# Patient Record
Sex: FEMALE | Race: WHITE | NOT HISPANIC OR LATINO | Employment: FULL TIME | URBAN - NONMETROPOLITAN AREA
[De-identification: names, ages, dates, MRNs, and addresses within clinical notes are randomized per-mention and may not be internally consistent; named-entity substitution may affect disease eponyms.]

---

## 2018-06-21 ENCOUNTER — OFFICE VISIT (OUTPATIENT)
Dept: URGENT CARE | Facility: PHYSICIAN GROUP | Age: 38
End: 2018-06-21

## 2018-06-21 VITALS
TEMPERATURE: 98.9 F | HEART RATE: 80 BPM | BODY MASS INDEX: 22.98 KG/M2 | WEIGHT: 143 LBS | OXYGEN SATURATION: 98 % | HEIGHT: 66 IN | DIASTOLIC BLOOD PRESSURE: 70 MMHG | SYSTOLIC BLOOD PRESSURE: 116 MMHG | RESPIRATION RATE: 14 BRPM

## 2018-06-21 DIAGNOSIS — Z02.1 PHYSICAL EXAM, PRE-EMPLOYMENT: Primary | ICD-10-CM

## 2018-06-21 PROCEDURE — 8915 PR COMPREHENSIVE PHYSICAL: Performed by: PHYSICIAN ASSISTANT

## 2018-06-21 NOTE — PROGRESS NOTES
Subjective:      Pt is a 38 y.o. female who presents with Employment Physical            HPI  Pt is here today for a work physical. Pt denies taking any medication. Pt states they have been in good health with no infections or illnesses lately. PT denies significant PMH and PSH. Pt denies CP, SOB, NVD, paresthesias, headaches, dizziness, change in vision, hives, or joint pain. Pt states current pain is 0/10. The pt's medication list, problem list, and allergies have been evaluated and reviewed during today's visit.    PMH:  Negative per pt.      PSH:  Negative per pt.      Fam Hx:  the patient's family history is not pertinent to their current complaint      Soc HX:  Social History     Social History   • Marital status: Single     Spouse name: N/A   • Number of children: N/A   • Years of education: N/A     Occupational History   • Not on file.     Social History Main Topics   • Smoking status: Former Smoker     Packs/day: 0.50     Quit date: 7/4/2013   • Smokeless tobacco: Never Used   • Alcohol use Yes      Comment: weekends   • Drug use: No   • Sexual activity: Not on file     Other Topics Concern   • Not on file     Social History Narrative   • No narrative on file         Medications:  No current outpatient prescriptions on file.      Allergies:  Demerol and Lexapro    ROS  Constitutional: Negative for fever, chills and malaise/fatigue.   HENT: Negative for congestion and sore throat.    Eyes: Negative for blurred vision, double vision and photophobia.   Respiratory: Negative for cough and shortness of breath.    Cardiovascular: Negative for chest pain and palpitations.   Gastrointestinal: Negative for heartburn, nausea, vomiting, abdominal pain, diarrhea and constipation.   Genitourinary: Negative for dysuria and flank pain.   Musculoskeletal: Negative for joint pain and myalgias.   Skin: Negative for itching and rash.   Neurological: Negative for dizziness, tingling and headaches.   Endo/Heme/Allergies: Does  "not bruise/bleed easily.   Psychiatric/Behavioral: Negative for depression. The patient is not nervous/anxious.           Objective:     /70   Pulse 80   Temp 37.2 °C (98.9 °F)   Resp 14   Ht 1.676 m (5' 6\")   Wt 64.9 kg (143 lb)   SpO2 98%   BMI 23.08 kg/m²      Physical Exam       Constitutional: PT is oriented to person, place, and time. PT appears well-developed and well-nourished. No distress.   HENT:   Head: Normocephalic and atraumatic.   Mouth/Throat: Oropharynx is clear and moist. No oropharyngeal exudate.   Eyes: Conjunctivae normal and EOM are normal. Pupils are equal, round, and reactive to light.   Neck: Normal range of motion. Neck supple. No thyromegaly present.   Cardiovascular: Normal rate, regular rhythm, normal heart sounds and intact distal pulses.  Exam reveals no gallop and no friction rub.    No murmur heard.  Pulmonary/Chest: Effort normal and breath sounds normal. No respiratory distress. PT has no wheezes. PT has no rales. Pt exhibits no tenderness.   Abdominal: Soft. Bowel sounds are normal. PT exhibits no distension and no mass. There is no tenderness. There is no rebound and no guarding.   Musculoskeletal: Normal range of motion. PT exhibits no edema and no tenderness.   Neurological: PT is alert and oriented to person, place, and time. PT has normal reflexes. No cranial nerve deficit.   Skin: Skin is warm and dry. No rash noted. PT is not diaphoretic. No erythema.       Psychiatric: PT has a normal mood and affect. PT behavior is normal. Judgment and thought content normal.        Assessment/Plan:     1. Physical exam, pre-employment    1. Sports physical    Please see scanned work physical form.  Pt is cleared for work at this time.  AVS with medical info given.  Pt was in full understanding and agreement with the plan.  Follow-up as needed if symptoms worsen or fail to improve.        "

## 2018-06-21 NOTE — PATIENT INSTRUCTIONS

## 2019-07-09 ENCOUNTER — OFFICE VISIT (OUTPATIENT)
Dept: URGENT CARE | Facility: PHYSICIAN GROUP | Age: 39
End: 2019-07-09
Payer: COMMERCIAL

## 2019-07-09 VITALS
OXYGEN SATURATION: 99 % | RESPIRATION RATE: 18 BRPM | DIASTOLIC BLOOD PRESSURE: 62 MMHG | WEIGHT: 130 LBS | TEMPERATURE: 96.9 F | HEART RATE: 78 BPM | HEIGHT: 66 IN | BODY MASS INDEX: 20.89 KG/M2 | SYSTOLIC BLOOD PRESSURE: 106 MMHG

## 2019-07-09 DIAGNOSIS — K04.7 TOOTH INFECTION: ICD-10-CM

## 2019-07-09 PROCEDURE — 99214 OFFICE O/P EST MOD 30 MIN: CPT | Performed by: PHYSICIAN ASSISTANT

## 2019-07-09 RX ORDER — IBUPROFEN 200 MG
200 TABLET ORAL EVERY 6 HOURS PRN
Status: ON HOLD | COMMUNITY
End: 2021-07-16

## 2019-07-09 RX ORDER — AMOXICILLIN AND CLAVULANATE POTASSIUM 875; 125 MG/1; MG/1
1 TABLET, FILM COATED ORAL 2 TIMES DAILY
Qty: 20 TAB | Refills: 0 | Status: SHIPPED | OUTPATIENT
Start: 2019-07-09 | End: 2019-07-19

## 2019-07-09 ASSESSMENT — ENCOUNTER SYMPTOMS
COUGH: 0
NAUSEA: 0
FEVER: 0
SORE THROAT: 0
VOMITING: 0
SINUS PAIN: 1
CHILLS: 0

## 2019-07-09 NOTE — PROGRESS NOTES
"Subjective:   Lorna Steele is a 39 y.o. female who presents for Oral Swelling (left side tooth [id number] )        This is a new problem. Pt complains of swelling and tooth pain x 2 days. Report missing filling from affected tooth (right upper tooth)  Pain is worse with eating, drinking and cold liquids. No other aggravating or alleviating factors. Took 400mg ibuprofen this morning for pain.  Denies nausea, vomiting, fevers, chills. Endorses mild facial swelling and redness.      Review of Systems   Constitutional: Negative for chills and fever.   HENT: Positive for sinus pain. Negative for sore throat.    Respiratory: Negative for cough.    Gastrointestinal: Negative for nausea and vomiting.       PMH: dental caries  MEDS:   Current Outpatient Prescriptions:   •  ibuprofen (MOTRIN) 200 MG Tab, Take 200 mg by mouth every 6 hours as needed., Disp: , Rfl:   •  amoxicillin-clavulanate (AUGMENTIN) 875-125 MG Tab, Take 1 Tab by mouth 2 times a day for 10 days., Disp: 20 Tab, Rfl: 0  ALLERGIES:   Allergies   Allergen Reactions   • Demerol      hives   • Lexapro      SURGHX: no relevant surgical hx  SOCHX:  reports that she quit smoking about 6 years ago. She smoked 0.50 packs per day. She has never used smokeless tobacco. She reports that she drinks alcohol. She reports that she does not use drugs.  FH: Family history was reviewed, no pertinent findings to report   Objective:   /62   Pulse 78   Temp 36.1 °C (96.9 °F) (Temporal)   Resp 18   Ht 1.676 m (5' 6\")   Wt 59 kg (130 lb)   SpO2 99%   BMI 20.98 kg/m²   Physical Exam   Constitutional: She is oriented to person, place, and time. She appears well-developed and well-nourished.  Non-toxic appearance. No distress.   HENT:   Head: Normocephalic and atraumatic.   Right Ear: External ear normal.   Left Ear: External ear normal.   Nose: Nose normal. No mucosal edema or rhinorrhea.   Mouth/Throat: Uvula is midline, oropharynx is clear and moist and mucous " membranes are normal.       Mild edema and erythema of left cheek.   Eyes: Conjunctivae and lids are normal.   Neck: Neck supple.   Cardiovascular: Normal rate and regular rhythm.    Pulmonary/Chest: Effort normal and breath sounds normal. No respiratory distress.   Abdominal: Normal appearance.   Musculoskeletal:   Normal range of motion. Exhibits no edema and no tenderness.    Neurological: She is alert and oriented to person, place, and time. No cranial nerve deficit or sensory deficit.   Skin: Skin is warm, dry and intact. Capillary refill takes less than 2 seconds.   Psychiatric: She has a normal mood and affect. Her speech is normal and behavior is normal. Judgment and thought content normal. Cognition and memory are normal.   Vitals reviewed.        Assessment/Plan:   1. Tooth infection  - amoxicillin-clavulanate (AUGMENTIN) 875-125 MG Tab; Take 1 Tab by mouth 2 times a day for 10 days.  Dispense: 20 Tab; Refill: 0    Other orders  - ibuprofen (MOTRIN) 200 MG Tab; Take 200 mg by mouth every 6 hours as needed.    Pt instructed to complete full course of medication despite symptomatic improvement. Pt to take med with meals to alleviate GI upset. Pt to take a probiotic or eat Rebecca’s yogurt/ kefir while taking the medication.    Rinse with hydrogen peroxide daily. Ibuprofen prn pain.  F/U with dental within the next 5-10 days.    Differential diagnosis, natural history, supportive care, and indications for immediate follow-up discussed.

## 2019-12-02 ENCOUNTER — TELEPHONE (OUTPATIENT)
Dept: SCHEDULING | Facility: IMAGING CENTER | Age: 39
End: 2019-12-02

## 2019-12-17 ENCOUNTER — TELEPHONE (OUTPATIENT)
Dept: SCHEDULING | Facility: IMAGING CENTER | Age: 39
End: 2019-12-17

## 2020-02-25 ENCOUNTER — OFFICE VISIT (OUTPATIENT)
Dept: MEDICAL GROUP | Facility: PHYSICIAN GROUP | Age: 40
End: 2020-02-25
Payer: COMMERCIAL

## 2020-02-25 VITALS
BODY MASS INDEX: 22.5 KG/M2 | HEIGHT: 66 IN | RESPIRATION RATE: 16 BRPM | OXYGEN SATURATION: 99 % | HEART RATE: 62 BPM | WEIGHT: 140 LBS | SYSTOLIC BLOOD PRESSURE: 108 MMHG | TEMPERATURE: 97.4 F | DIASTOLIC BLOOD PRESSURE: 70 MMHG

## 2020-02-25 DIAGNOSIS — N92.6 IRREGULAR PERIODS: ICD-10-CM

## 2020-02-25 PROCEDURE — 99204 OFFICE O/P NEW MOD 45 MIN: CPT | Performed by: FAMILY MEDICINE

## 2020-02-25 NOTE — PROGRESS NOTES
Subjective:     CC:  The encounter diagnosis was Irregular periods.    HISTORY OF THE PRESENT ILLNESS: Patient is a 39 y.o. female. This pleasant patient is here today to establish care and discuss the following problems.   Prior PCP: None.    Irregular periods  This is a new/chronic condition.  Symptom onset: She has had irregular periods since age 30.   Current symptoms: She is having spotting all month long. No menstrual cramps.   Since onset symptoms are: Unchanged  Modifying factors: Had pelvic US 5 years ago.   Treatments tried: Has never had an IUD. Has tried OCPs but had a lot of mood swings. Has also had bio-identical cream without help.   Associated symptoms: Mild dyspareunia     OB hx:   No STD hx. However, she did have PID and treated properly approx age 17.   Menarche at 12/regular/7 days        Allergies: Lexapro and Demerol    No current Epic-ordered outpatient medications on file.     No current [facility]-ordered facility-administered medications on file.        Past Medical History:   Diagnosis Date   • Irregular periods        Past Surgical History:   Procedure Laterality Date   • PRIMARY C SECTION         Social History     Tobacco Use   • Smoking status: Former Smoker     Last attempt to quit: 2018     Years since quittin.1   • Smokeless tobacco: Never Used   Substance Use Topics   • Alcohol use: Yes     Alcohol/week: 1.2 oz     Types: 2 Glasses of wine per week   • Drug use: Never       Social History     Social History Narrative   • Not on file       Family History   Problem Relation Age of Onset   • Cancer Mother         ovarian   • Cancer Maternal Grandmother         ovarian       Health Maintenance: Completed    ROS:   Gen: no fevers/chills, no changes in weight  Eyes: no changes in vision  ENT: no sore throat, no hearing loss, no bloody nose  Pulm: no sob, no cough  CV: no chest pain, no palpitations  GI: no nausea/vomiting, no diarrhea  : no dysuria  MSk: no myalgias  Skin: no  "rash  Neuro: no headaches, no numbness/tingling  Heme/Lymph: + easy bruising      Objective:     Exam: /70 (BP Location: Left arm, Patient Position: Sitting, BP Cuff Size: Adult)   Pulse 62   Temp 36.3 °C (97.4 °F) (Temporal)   Resp 16   Ht 1.676 m (5' 6\")   Wt 63.5 kg (140 lb)   SpO2 99%  Body mass index is 22.6 kg/m².    General: Normal appearing. No distress.  HENT: Normocephalic. Ears normal shape and contour, canals are clear bilaterally, tympanic membranes are benign, nasal mucosa benign, oropharynx is without erythema, edema or exudates.   Eyes: Conjunctiva clear lids without ptosis, pupils equal and reactive to light accommodation.  Neck: Supple without JVD. Thyroid is not enlarged.  Pulmonary: Clear to ausculation.  Normal effort. No rales, ronchi, or wheezing.  Cardiovascular: Regular rate and rhythm without murmur. Radial pulses are intact and equal bilaterally.  Abdomen: Soft, slight tender to the right lower pelvic region, nondistended. Normal bowel sounds. Liver and spleen are not palpable  Neurologic: Moves all extremities  Lymph: No cervical or supraclavicular lymph nodes are palpable  Skin: Warm and dry.  No obvious lesions.  Musculoskeletal: Normal gait. No extremity cyanosis, clubbing, or edema.  Psych: Normal mood and affect. Alert and oriented x3. Judgment and insight is normal.    Assessment & Plan:   39 y.o. female with the following -    1. Irregular periods  This is a chronic, unchanged condition.  The patient has a longstanding history of irregular periods often lasting 7 days with intermittent spotting in between periods of unknown etiology.  She does endorse a history of \"cysts \"and there was a conversation of a possible hysterectomy but she decided to hold off on this approximately 5 years ago.  She continues to experience the same problems.  She has tried oral contraceptives but is not sure which one and is not interested in them at this point.  Has never tried IUD either.  " At this time I would like to check her hormone levels along with a TSH.  I will also repeat pelvic ultrasound for further evaluation.  The patient is agreeable to plan.  - ESTRADIOL; Future  - TESTOSTERONE SERUM; Future  - PROGESTERONE; Future  - FSH; Future  - LUTEINIZING HORMONE SERUM; Future  - TSH WITH REFLEX TO FT4; Future  - CBC WITHOUT DIFFERENTIAL; Future  - Basic Metabolic Panel; Future  - US-PELVIC TRANSVAGINAL ONLY; Future      Return in about 4 weeks (around 3/24/2020) for PAP, PHYSICAL.    Please note that this dictation was created using voice recognition software. I have made every reasonable attempt to correct obvious errors, but I expect that there are errors of grammar and possibly content that I did not discover before finalizing the note.

## 2020-02-25 NOTE — ASSESSMENT & PLAN NOTE
This is a new/chronic condition.  Symptom onset: She has had irregular periods since age 30.   Current symptoms: She is having spotting all month long. No menstrual cramps.   Since onset symptoms are: Unchanged  Modifying factors: Had pelvic US 5 years ago.   Treatments tried: Has never had an IUD. Has tried OCPs but had a lot of mood swings. Has also had bio-identical cream without help.   Associated symptoms: Mild dyspareunia     OB hx:   No STD hx. However, she did have PID and treated properly approx age 17.   Menarche at 12/regular/7 days

## 2020-03-09 ENCOUNTER — TELEPHONE (OUTPATIENT)
Dept: MEDICAL GROUP | Facility: PHYSICIAN GROUP | Age: 40
End: 2020-03-09

## 2020-03-09 ENCOUNTER — HOSPITAL ENCOUNTER (OUTPATIENT)
Dept: RADIOLOGY | Facility: MEDICAL CENTER | Age: 40
End: 2020-03-09
Attending: FAMILY MEDICINE
Payer: COMMERCIAL

## 2020-03-09 DIAGNOSIS — N92.6 IRREGULAR PERIODS: ICD-10-CM

## 2020-03-09 PROCEDURE — 76856 US EXAM PELVIC COMPLETE: CPT

## 2020-03-09 NOTE — TELEPHONE ENCOUNTER
----- Message from Sameer Faria M.D. sent at 3/9/2020 10:22 AM PDT -----  Please call patient to let know:    Ultrasound is back.  It looks like the patient has multiple uterine fibroids which explained the reason why she is having irregular periods.    We will further discuss these results at her next visit.    Thank you,    Sameer Faria MD  Family Medicine Physician  [facility]  [phone]

## 2020-03-09 NOTE — TELEPHONE ENCOUNTER
Phone Number Called: [phone] (home)     Call outcome: Did not leave a detailed message. Requested patient to call back.    Message: LVM to call back.

## 2020-03-10 NOTE — TELEPHONE ENCOUNTER
Gave patient results during her husbands appointment today 3/9/2020. Patient plans to keep follow up.

## 2020-03-31 ENCOUNTER — APPOINTMENT (OUTPATIENT)
Dept: MEDICAL GROUP | Facility: PHYSICIAN GROUP | Age: 40
End: 2020-03-31
Payer: COMMERCIAL

## 2020-08-10 ENCOUNTER — HOSPITAL ENCOUNTER (OUTPATIENT)
Dept: LAB | Facility: MEDICAL CENTER | Age: 40
End: 2020-08-10
Attending: FAMILY MEDICINE
Payer: COMMERCIAL

## 2020-08-10 DIAGNOSIS — N92.6 IRREGULAR PERIODS: ICD-10-CM

## 2020-08-10 LAB
ANION GAP SERPL CALC-SCNC: 13 MMOL/L (ref 7–16)
ANISOCYTOSIS BLD QL SMEAR: NORMAL
BUN SERPL-MCNC: 17 MG/DL (ref 8–22)
CALCIUM SERPL-MCNC: 9.4 MG/DL (ref 8.5–10.5)
CHLORIDE SERPL-SCNC: 105 MMOL/L (ref 96–112)
CO2 SERPL-SCNC: 20 MMOL/L (ref 20–33)
CREAT SERPL-MCNC: 0.73 MG/DL (ref 0.5–1.4)
ERYTHROCYTE [DISTWIDTH] IN BLOOD BY AUTOMATED COUNT: 48.3 FL (ref 35.9–50)
ESTRADIOL SERPL-MCNC: 119 PG/ML
FASTING STATUS PATIENT QL REPORTED: NORMAL
FSH SERPL-ACNC: 5.7 MIU/ML
GLUCOSE SERPL-MCNC: 98 MG/DL (ref 65–99)
HCT VFR BLD AUTO: 41.1 % (ref 37–47)
HGB BLD-MCNC: 12.3 G/DL (ref 12–16)
LH SERPL-ACNC: 13.4 IU/L
MCH RBC QN AUTO: 25 PG (ref 27–33)
MCHC RBC AUTO-ENTMCNC: 29.9 G/DL (ref 33.6–35)
MCV RBC AUTO: 83.5 FL (ref 81.4–97.8)
MICROCYTES BLD QL SMEAR: NORMAL
MORPHOLOGY BLD-IMP: NORMAL
PLATELET # BLD AUTO: 223 K/UL (ref 164–446)
PLATELET BLD QL SMEAR: NORMAL
PMV BLD AUTO: 11 FL (ref 9–12.9)
POTASSIUM SERPL-SCNC: 4.3 MMOL/L (ref 3.6–5.5)
PROGEST SERPL-MCNC: 14 NG/ML
RBC # BLD AUTO: 4.92 M/UL (ref 4.2–5.4)
RBC BLD AUTO: PRESENT
SODIUM SERPL-SCNC: 138 MMOL/L (ref 135–145)
TESTOST SERPL-MCNC: <10 NG/DL (ref 9–75)
TSH SERPL DL<=0.005 MIU/L-ACNC: 2.56 UIU/ML (ref 0.38–5.33)
WBC # BLD AUTO: 4.2 K/UL (ref 4.8–10.8)

## 2020-08-10 PROCEDURE — 36415 COLL VENOUS BLD VENIPUNCTURE: CPT

## 2020-08-10 PROCEDURE — 85027 COMPLETE CBC AUTOMATED: CPT

## 2020-08-10 PROCEDURE — 83001 ASSAY OF GONADOTROPIN (FSH): CPT

## 2020-08-10 PROCEDURE — 84144 ASSAY OF PROGESTERONE: CPT

## 2020-08-10 PROCEDURE — 80048 BASIC METABOLIC PNL TOTAL CA: CPT

## 2020-08-10 PROCEDURE — 83002 ASSAY OF GONADOTROPIN (LH): CPT

## 2020-08-10 PROCEDURE — 84443 ASSAY THYROID STIM HORMONE: CPT

## 2020-08-10 PROCEDURE — 84403 ASSAY OF TOTAL TESTOSTERONE: CPT

## 2020-08-10 PROCEDURE — 82670 ASSAY OF TOTAL ESTRADIOL: CPT

## 2020-08-11 ENCOUNTER — TELEPHONE (OUTPATIENT)
Dept: MEDICAL GROUP | Facility: PHYSICIAN GROUP | Age: 40
End: 2020-08-11

## 2020-08-11 NOTE — TELEPHONE ENCOUNTER
Phone Number Called: [phone] (home)     Call outcome: Did not leave a detailed message. Requested patient to call back.    Message: LVM to call back.

## 2020-08-11 NOTE — TELEPHONE ENCOUNTER
----- Message from Sameer Faria M.D. sent at 8/11/2020  2:40 PM PDT -----  Please call patient to let know:    -all hormone levels are normal  -thyroid is normal  -kidneys are function  -white blood cell count is borderline low but this value can fluctuate. So nothing to do at this time.  -no anemia    Thank you,    Dr. LASHA Faria  Family Medicine Physician  [facility]  [phone]

## 2020-08-12 ENCOUNTER — TELEPHONE (OUTPATIENT)
Dept: MEDICAL GROUP | Facility: PHYSICIAN GROUP | Age: 40
End: 2020-08-12

## 2020-08-12 NOTE — TELEPHONE ENCOUNTER
[phone] (home)   Lorna Rothman, called back  Gave updated Lab results to patient.    Amador Ba, Med Ass't

## 2020-08-12 NOTE — TELEPHONE ENCOUNTER
Lorna Villa  Called requested WBC number.    Patient requested to be scheduled  Scheduled patient for 8/24/20 to discuss labs    Amador Ba, Med Ass't

## 2020-08-24 ENCOUNTER — HOSPITAL ENCOUNTER (OUTPATIENT)
Facility: MEDICAL CENTER | Age: 40
End: 2020-08-24
Attending: FAMILY MEDICINE
Payer: COMMERCIAL

## 2020-08-24 ENCOUNTER — OFFICE VISIT (OUTPATIENT)
Dept: MEDICAL GROUP | Facility: PHYSICIAN GROUP | Age: 40
End: 2020-08-24
Payer: COMMERCIAL

## 2020-08-24 VITALS
HEIGHT: 66 IN | HEART RATE: 74 BPM | BODY MASS INDEX: 22.21 KG/M2 | WEIGHT: 138.2 LBS | TEMPERATURE: 97.4 F | RESPIRATION RATE: 16 BRPM | SYSTOLIC BLOOD PRESSURE: 118 MMHG | DIASTOLIC BLOOD PRESSURE: 74 MMHG | OXYGEN SATURATION: 99 %

## 2020-08-24 DIAGNOSIS — Z12.4 ROUTINE CERVICAL SMEAR: ICD-10-CM

## 2020-08-24 DIAGNOSIS — Z11.51 ENCOUNTER FOR SCREENING FOR HUMAN PAPILLOMAVIRUS (HPV): ICD-10-CM

## 2020-08-24 DIAGNOSIS — Z01.419 WELL WOMAN EXAM WITH ROUTINE GYNECOLOGICAL EXAM: ICD-10-CM

## 2020-08-24 DIAGNOSIS — F41.9 ANXIETY: ICD-10-CM

## 2020-08-24 PROCEDURE — 99000 SPECIMEN HANDLING OFFICE-LAB: CPT | Performed by: FAMILY MEDICINE

## 2020-08-24 PROCEDURE — 87624 HPV HI-RISK TYP POOLED RSLT: CPT

## 2020-08-24 PROCEDURE — 88175 CYTOPATH C/V AUTO FLUID REDO: CPT

## 2020-08-24 PROCEDURE — 99395 PREV VISIT EST AGE 18-39: CPT | Performed by: FAMILY MEDICINE

## 2020-08-24 RX ORDER — HYDROXYZINE HYDROCHLORIDE 25 MG/1
25 TABLET, FILM COATED ORAL 3 TIMES DAILY PRN
Qty: 30 TAB | Refills: 3 | Status: ON HOLD | OUTPATIENT
Start: 2020-08-24 | End: 2021-07-16

## 2020-08-24 NOTE — PROGRESS NOTES
Subjective:     CC:   Chief Complaint   Patient presents with   • Annual Exam     w pap        HPI:   Lorna Villa is a 40 y.o. female who presents for annual exam. She is feeling well and denies any complaints.    Ob-Gyn/ History:    Patient has GYN provider: no  /Para:  5/3  Last Pap Smear:  7 years ago. No history of abnormal pap smears.  Gyn Surgery:  None.   Current Contraceptive Method:  None. Is currently sexually active.  Last menstrual period:  1 week ago.  Periods regular. spotting bleeding.   Urinary incontinence: A little but stable      Health Maintenance  Diet: Eating healthy  Exercise: She works out 3x weekly  Substance Abuse: None.  Safe in relationship.   Seat belts, bike helmet, gun safety discussed.  Sun protection used.    Cancer screening  Colorectal Cancer Screening: not indicated    Infectious disease screening/Immunizations    --HIV Screening: negative  --Immunizations:    UTD    She  has a past medical history of Irregular periods.  She  has a past surgical history that includes primary c section.    Family History   Problem Relation Age of Onset   • Cancer Mother         ovarian   • Cancer Maternal Grandmother         ovarian       Social History     Socioeconomic History   • Marital status:      Spouse name: Not on file   • Number of children: Not on file   • Years of education: Not on file   • Highest education level: Not on file   Occupational History   • Not on file   Social Needs   • Financial resource strain: Not on file   • Food insecurity     Worry: Not on file     Inability: Not on file   • Transportation needs     Medical: Not on file     Non-medical: Not on file   Tobacco Use   • Smoking status: Former Smoker     Packs/day: 0.50     Quit date: 2018     Years since quittin.6   • Smokeless tobacco: Never Used   Substance and Sexual Activity   • Alcohol use: Yes     Alcohol/week: 1.2 oz     Types: 2 Glasses of wine per week     Comment: weekends   • Drug use:  "Never   • Sexual activity: Not on file   Lifestyle   • Physical activity     Days per week: Not on file     Minutes per session: Not on file   • Stress: Not on file   Relationships   • Social connections     Talks on phone: Not on file     Gets together: Not on file     Attends [employer] service: Not on file     Active member of club or organization: Not on file     Attends meetings of clubs or organizations: Not on file     Relationship status: Not on file   • Intimate partner violence     Fear of current or ex partner: Not on file     Emotionally abused: Not on file     Physically abused: Not on file     Forced sexual activity: Not on file   Other Topics Concern   • Not on file   Social History Narrative    ** Merged History Encounter **            Patient Active Problem List    Diagnosis Date Noted   • Irregular periods 02/25/2020         Current Outpatient Medications   Medication Sig Dispense Refill   • hydrOXYzine HCl (ATARAX) 25 MG Tab Take 1 Tab by mouth 3 times a day as needed for Anxiety. 30 Tab 3   • ibuprofen (MOTRIN) 200 MG Tab Take 200 mg by mouth every 6 hours as needed.       No current facility-administered medications for this visit.      Allergies   Allergen Reactions   • Lexapro Anaphylaxis     \"go blind\" shock   • Demerol      hives   • Demerol Hives   • Lexapro        Review of Systems   Constitutional: Negative for fever, chills and malaise/fatigue.   HENT: Negative for congestion.    Eyes: Negative for pain.   Respiratory: Negative for cough and shortness of breath.    Cardiovascular: Negative for leg swelling.   Gastrointestinal: Negative for nausea, vomiting, abdominal pain and diarrhea.   Genitourinary: Negative for dysuria and hematuria.   Skin: Negative for rash.   Neurological: Negative for dizziness, focal weakness and headaches.   Endo/Heme/Allergies: Does not bruise/bleed easily.   Psychiatric/Behavioral: Negative for depression.  The patient is not nervous/anxious.      Objective: " "    /74 (BP Location: Left arm, Patient Position: Sitting, BP Cuff Size: Adult)   Pulse 74   Temp 36.3 °C (97.4 °F) (Temporal)   Resp 16   Ht 1.676 m (5' 6\")   Wt 62.7 kg (138 lb 3.2 oz)   SpO2 99%   BMI 22.31 kg/m²   Body mass index is 22.31 kg/m².  Wt Readings from Last 4 Encounters:   08/24/20 62.7 kg (138 lb 3.2 oz)   02/25/20 63.5 kg (140 lb)   07/09/19 59 kg (130 lb)   06/21/18 64.9 kg (143 lb)       Physical Exam:  Constitutional: Well-developed and well-nourished. Not diaphoretic. No distress.   Skin: Skin is warm and dry. No rash noted.  Head: Atraumatic without lesions.  Eyes: Conjunctivae and extraocular motions are normal. Pupils are equal, round, and reactive to light. No scleral icterus.   Ears:  External ears unremarkable. Tympanic membranes clear and intact.  Nose: Nares patent. Septum midline. Turbinates without erythema nor edema. No discharge.   Mouth/Throat: Dentition is normal. Tongue normal. Oropharynx is clear and moist. Posterior pharynx without erythema or exudates.  Neck: Supple, trachea midline. Normal range of motion. No thyromegaly present. No lymphadenopathy--cervical or supraclavicular.  Cardiovascular: Regular rate and rhythm, S1 and S2 without murmur, rubs, or gallops.  Lungs: Normal inspiratory effort, CTA bilaterally, no wheezes/rhonchi/rales  Abdomen: Soft, non tender, and without distention. Active bowel sounds in all four quadrants. No rebound, guarding, masses or HSM.  :Perineum and external genitalia normal without rash. Vagina with normal and physiologic discharge. Cervix without visible lesions or discharge.   Extremities: No cyanosis, clubbing, erythema, nor edema. Distal pulses intact and symmetric.   Musculoskeletal: All major joints AROM full in all directions without pain.  Neurological: Alert and oriented x 3. DTRs 2+ and symmetric. No cranial nerve deficit. 5/5 myotomes. Sensation intact. Negative Rhomberg.  Psychiatric:  Behavior, mood, and affect are " appropriate.    A chaperone was offered to the patient during today's exam. Chaperone name: Patricia Nicole was present.    Assessment and Plan:     1. Anxiety  hydrOXYzine HCl (ATARAX) 25 MG Tab   2. Well woman exam with routine gynecological exam  THINPREP PAP WITH HPV   3. Routine cervical smear  THINPREP PAP WITH HPV   4. Encounter for screening for human papillomavirus (HPV)  THINPREP PAP WITH HPV       HCM: Completed.  Pap done.  Labs per orders  Patient counseled about skin care, diet, supplements, prenatal vitamins, safe sex and exercise.    Follow-up: Return in about 1 year (around 8/24/2021).

## 2020-08-25 LAB
CYTOLOGY REG CYTOL: NORMAL
HPV HR 12 DNA CVX QL NAA+PROBE: NEGATIVE
HPV16 DNA SPEC QL NAA+PROBE: NEGATIVE
HPV18 DNA SPEC QL NAA+PROBE: NEGATIVE
SPECIMEN SOURCE: NORMAL

## 2020-08-27 ENCOUNTER — TELEPHONE (OUTPATIENT)
Dept: MEDICAL GROUP | Facility: PHYSICIAN GROUP | Age: 40
End: 2020-08-27

## 2020-08-27 DIAGNOSIS — D21.9 LEIOMYOMA: ICD-10-CM

## 2020-08-27 NOTE — TELEPHONE ENCOUNTER
----- Message from Sameer Faria M.D. sent at 8/27/2020 10:44 AM PDT -----  Please call patient to let know:    Pap smear results are normal    Thank you,    Dr. LASHA Faria  Family Medicine Physician  [facility] [place]  [phone]

## 2020-08-27 NOTE — TELEPHONE ENCOUNTER
Phone Number Called: [phone] (home)     Call outcome: Did not leave a detailed message. Requested patient to call back.    Message: cb for results

## 2020-08-28 NOTE — TELEPHONE ENCOUNTER
Phone Number Called: [phone] (home)     Call outcome: Did not leave a detailed message. Requested patient to call back.    Message: VM full

## 2020-08-28 NOTE — TELEPHONE ENCOUNTER
Phone Number Called: [phone] (home)     Call outcome: Spoke to patient regarding message below.    Message: Patient is confused by normal results would like a referral to gyn to follow up on continuous bleeding.

## 2021-02-23 ENCOUNTER — OFFICE VISIT (OUTPATIENT)
Dept: URGENT CARE | Facility: PHYSICIAN GROUP | Age: 41
End: 2021-02-23
Payer: COMMERCIAL

## 2021-02-23 ENCOUNTER — HOSPITAL ENCOUNTER (OUTPATIENT)
Dept: RADIOLOGY | Facility: MEDICAL CENTER | Age: 41
End: 2021-02-23
Attending: PHYSICIAN ASSISTANT
Payer: COMMERCIAL

## 2021-02-23 VITALS
BODY MASS INDEX: 22.76 KG/M2 | TEMPERATURE: 96.8 F | OXYGEN SATURATION: 100 % | RESPIRATION RATE: 18 BRPM | DIASTOLIC BLOOD PRESSURE: 70 MMHG | HEIGHT: 67 IN | HEART RATE: 99 BPM | WEIGHT: 145 LBS | SYSTOLIC BLOOD PRESSURE: 130 MMHG

## 2021-02-23 DIAGNOSIS — R10.30 LOWER ABDOMINAL PAIN: ICD-10-CM

## 2021-02-23 DIAGNOSIS — D25.9 UTERINE LEIOMYOMA, UNSPECIFIED LOCATION: ICD-10-CM

## 2021-02-23 LAB
APPEARANCE UR: NORMAL
BILIRUB UR STRIP-MCNC: NORMAL MG/DL
COLOR UR AUTO: YELLOW
GLUCOSE UR STRIP.AUTO-MCNC: NORMAL MG/DL
KETONES UR STRIP.AUTO-MCNC: NORMAL MG/DL
LEUKOCYTE ESTERASE UR QL STRIP.AUTO: NORMAL
NITRITE UR QL STRIP.AUTO: NORMAL
PH UR STRIP.AUTO: 6.5 [PH] (ref 5–8)
PROT UR QL STRIP: NORMAL MG/DL
RBC UR QL AUTO: NORMAL
SP GR UR STRIP.AUTO: 1.02
UROBILINOGEN UR STRIP-MCNC: 0.2 MG/DL

## 2021-02-23 PROCEDURE — 99214 OFFICE O/P EST MOD 30 MIN: CPT | Performed by: PHYSICIAN ASSISTANT

## 2021-02-23 PROCEDURE — 81002 URINALYSIS NONAUTO W/O SCOPE: CPT | Performed by: PHYSICIAN ASSISTANT

## 2021-02-23 PROCEDURE — 76830 TRANSVAGINAL US NON-OB: CPT

## 2021-02-23 ASSESSMENT — ENCOUNTER SYMPTOMS
BLOOD IN STOOL: 0
CHILLS: 0
WEAKNESS: 0
HEARTBURN: 0
DIZZINESS: 0
WEIGHT LOSS: 0
CONSTIPATION: 0
FLANK PAIN: 0
DIAPHORESIS: 0
SHORTNESS OF BREATH: 0
DIARRHEA: 0
HEADACHES: 0
PALPITATIONS: 0
NAUSEA: 0
COUGH: 0
FEVER: 0
VOMITING: 0
ABDOMINAL PAIN: 1
MYALGIAS: 0

## 2021-02-23 NOTE — PROGRESS NOTES
Subjective:   Lorna Villa is a 40 y.o. female who presents for Bump (in groin [id number] )      HPI:  This is a very pleasant 40-year-old female presenting to the clinic with lower abdominal pain x3 days.  Patient states she has been experiencing a deep pressure and achy sensation to her suprapubic region.  She feels as if there are are palpable bumps near and around her uterus that are causing pain.  She also feels like she has urinary frequency over this time.  She denies any dysuria, hematuria or vaginal discharge.  Her last menstrual cycle was 1 month ago.  She does have a history of irregular periods.  Her last menstrual cycle lasted 1 day prior to that she had one lasting 3 weeks.  She has not on any OCPs for her symptoms due to side effects.  She does have her tubes tied.  She has been having normal bowel movements.  Denies any diarrhea or constipation.  She denies any nausea, vomiting, fevers or chills.  She has not tried any OTC medications for her symptoms.  Currently she does not see a gynecologist.    Review of Systems   Constitutional: Negative for chills, diaphoresis, fever, malaise/fatigue and weight loss.   Respiratory: Negative for cough and shortness of breath.    Cardiovascular: Negative for chest pain and palpitations.   Gastrointestinal: Positive for abdominal pain. Negative for blood in stool, constipation, diarrhea, heartburn, melena, nausea and vomiting.   Genitourinary: Positive for frequency. Negative for dysuria, flank pain, hematuria and urgency.   Musculoskeletal: Negative for myalgias.   Skin: Negative for rash.   Neurological: Negative for dizziness, weakness and headaches.       Medications:    • hydrOXYzine HCl Tabs  • ibuprofen Tabs  • multivitamin Tabs    Allergies: Lexapro, Demerol, Demerol, and Lexapro    Problem List: Lorna Villa has Irregular periods on their problem list.    Surgical History:  Past Surgical History:   Procedure Laterality Date   • PRIMARY C SECTION    "      Past Social Hx: Lorna Villa  reports that she quit smoking about 3 years ago. She smoked 0.50 packs per day. She has never used smokeless tobacco. She reports current alcohol use of about 1.2 oz of alcohol per week. She reports that she does not use drugs.     Past Family Hx:  Lorna Villa family history includes Cancer in her maternal grandmother and mother.     Problem list, medications, and allergies reviewed by myself today in Epic.     Objective:     /70   Pulse 99   Temp 36 °C (96.8 °F) (Temporal)   Resp 18   Ht 1.689 m (5' 6.5\")   Wt 65.8 kg (145 lb)   SpO2 100%   BMI 23.05 kg/m²     Physical Exam  Constitutional:       General: She is not in acute distress.     Appearance: Normal appearance. She is not ill-appearing, toxic-appearing or diaphoretic.   HENT:      Head: Normocephalic and atraumatic.      Mouth/Throat:      Mouth: Mucous membranes are moist.      Pharynx: No oropharyngeal exudate or posterior oropharyngeal erythema.   Eyes:      Conjunctiva/sclera: Conjunctivae normal.   Cardiovascular:      Rate and Rhythm: Normal rate and regular rhythm.      Pulses: Normal pulses.      Heart sounds: Normal heart sounds.   Pulmonary:      Effort: Pulmonary effort is normal.      Breath sounds: Normal breath sounds. No wheezing.   Abdominal:      General: Bowel sounds are normal. There is distension.      Palpations: Abdomen is soft. There is no mass.      Tenderness: There is abdominal tenderness in the suprapubic area. There is no right CVA tenderness, left CVA tenderness, guarding or rebound.      Hernia: No hernia is present.          Comments: Mild lower abdominal distention.  Slight tenderness to palpation in the suprapubic region.  Negative McBurney sign.  Negative Rovsing's.  No rebound or guarding.  Normal bowel sounds.   Musculoskeletal:         General: Normal range of motion.      Cervical back: Normal range of motion. No muscular tenderness.   Lymphadenopathy:      Cervical: " No cervical adenopathy.   Skin:     General: Skin is warm and dry.      Capillary Refill: Capillary refill takes less than 2 seconds.   Neurological:      General: No focal deficit present.      Mental Status: She is alert and oriented to person, place, and time. Mental status is at baseline.   Psychiatric:         Mood and Affect: Mood normal.         Thought Content: Thought content normal.     Urine analysis: Within normal limits.     Pelvic ultrasound:    FINDINGS:  Both transabdominal and transvaginal scanning were performed to optimally visualize the pelvis.     The uterus is enlarged and measures 10.0 cm x 16.4 cm x 13.1 cm. Previous measurements were 8.5 x 12.2 x 8.9 cm.  The uterine myometrium is heterogeneous. There is a large uterine myoma emanating from the anterior aspect of the fundus measuring 10.8 x 10.6 x 10.5 cm. Previous measurements were 8.1 x 7.3 x 7.4 cm.  The previously identified smaller exophytic myoma emanating from the posterior aspect of the uterine fundus measuring 3.6 cm in maximum dimension is not not definitively identified on the current examination.  The endometrial echo complex is not seen for accurate measurement.     Low resistive waveforms are confirmed within the left ovary.  The right ovary is not visualized.     The left ovary measures 3.3 cm x 2.9 cm x 1.8 cm.     There is no free fluid seen.     IMPRESSION:     1.  Enlarged uterus with increased size of large myoma emanating from the anterior aspect of the uterine fundus currently measuring 10.8 cm in maximum dimension. Previous maximum dimension was 8.1 cm.  2.  Endometrial complex not visualized.  3.  Normal appearance of the left ovary.  4.  Right ovary not visualized.  5.  Previously identified posterior right fundal myoma is not visualized.  6.  No free fluid identified.      Assessment/Plan:     Diagnosis and associated orders:   1. Lower abdominal pain  - POCT Urinalysis  - US-PELVIC COMPLETE  (TRANSABDOMINAL/TRANSVAGINAL) (COMBO); Future    2. Uterine leiomyoma, unspecified location  - REFERRAL TO OB/GYN     Comments/MDM:       • Differential diagnoses and treatment options were discussed with the patient at length today.  Patient has had intermittent lower abdominal pain and bloating.  She also has a history of irregular menstrual cycles.  Differentials at this time include uterine fibroids versus ovarian cyst versus endometriosis.  At this time we scheduled an outpatient ultrasound to be completed at 1:30 this afternoon.  I will follow-up once results are available.  • Spoke with the patient at 7:45 PM and informed her of her ultrasound findings.  Patient has an enlarging myoma currently measuring 10.8 cm.  I placed a referral to follow-up with OB/GYN.  I encouraged her to call with any questions or concerns.           Differential diagnosis, natural history, supportive care, and indications for immediate follow-up discussed.    Advised the patient to follow-up with the primary care physician for recheck, reevaluation, and consideration of further management.    Please note that this dictation was created using voice recognition software. I have made reasonable attempt to correct obvious errors, but I expect that there are errors of grammar and possibly content that I did not discover before finalizing the note.    This note was electronically signed by CON Daniel PA-C

## 2021-03-15 ENCOUNTER — APPOINTMENT (OUTPATIENT)
Dept: MEDICAL GROUP | Facility: PHYSICIAN GROUP | Age: 41
End: 2021-03-15
Payer: COMMERCIAL

## 2021-04-13 ENCOUNTER — GYNECOLOGY VISIT (OUTPATIENT)
Dept: OBGYN | Facility: CLINIC | Age: 41
End: 2021-04-13
Payer: COMMERCIAL

## 2021-04-13 VITALS — WEIGHT: 146 LBS | SYSTOLIC BLOOD PRESSURE: 121 MMHG | DIASTOLIC BLOOD PRESSURE: 72 MMHG | BODY MASS INDEX: 23.21 KG/M2

## 2021-04-13 DIAGNOSIS — D25.9 UTERINE LEIOMYOMA, UNSPECIFIED LOCATION: ICD-10-CM

## 2021-04-13 DIAGNOSIS — N93.9 ABNORMAL UTERINE BLEEDING (AUB): ICD-10-CM

## 2021-04-13 PROCEDURE — 99204 OFFICE O/P NEW MOD 45 MIN: CPT | Performed by: OBSTETRICS & GYNECOLOGY

## 2021-04-13 RX ORDER — ACETAMINOPHEN 325 MG/1
650 TABLET ORAL EVERY 4 HOURS PRN
Status: ON HOLD | COMMUNITY
End: 2021-07-16

## 2021-04-13 NOTE — PROGRESS NOTES
"No chief complaint on file.  CC: AUB    History of present illness: 40 y.o.  No LMP recorded. presents with management for AUB.  Has had US and told had fibroid uterus.  Was told possibly needed hysterectomy for 8 years and was avoiding it. Had 3 c/s in past.  Has pain, bloating, feels the mass in lower abdomen and it is very bothersome for her.  Has had heavy irregular bleeding for quite some time.  Tried hormones and had mood changes with it.  Has a grandmother that had ovarian cancer around age 50, is worried this could happen at some point. Is ready for surgery for her bleeding.      Review of systems:  Pertinent positives documented in HPI and all other systems reviewed & are negative    Past OB History:   OB History    Para Term  AB Living   5 3 3 0 2 3   SAB TAB Ectopic Molar Multiple Live Births   2 0 0 0 0 3       Past Gynecological History  No LMP recorded.  BC or HRT: None  Postmenopausal?: denies, though was told was perimenousal before   Menarche: 12  Menses: irregular, constant  Sexually active: yes, bleeds afterward  Number of lifetime sexual partners: 1, men  Sexually transmitted infections: denies  Pap: last 2020, denies hx of abnormal  Symptoms of overactive bladder: sometimes  Symptoms of stress incontinence: yes, not bothersome  Symptoms of bowel incontinence: denies  Feeling of vaginal bulge: denies  History of sexual abuse: denies  Fibroids?: YES  Ovarian cysts?: yes in past    All PMH, PSH, allergies, social history and FH reviewed and updated today:  Past Medical History:   Diagnosis Date   • Irregular periods        Past Surgical History:   Procedure Laterality Date   • PRIMARY C SECTION         Allergies:   Allergies   Allergen Reactions   • Lexapro Anaphylaxis     \"go blind\" shock   • Demerol      hives   • Demerol Hives   • Lexapro        Social History     Socioeconomic History   • Marital status:      Spouse name: Not on file   • Number of children: Not " on file   • Years of education: Not on file   • Highest education level: Not on file   Occupational History   • Not on file   Tobacco Use   • Smoking status: Former Smoker     Packs/day: 0.50     Quit date: 2018     Years since quitting: 3.2   • Smokeless tobacco: Never Used   Substance and Sexual Activity   • Alcohol use: Yes     Alcohol/week: 1.2 oz     Types: 2 Glasses of wine per week     Comment: weekends   • Drug use: Never   • Sexual activity: Yes     Partners: Male   Other Topics Concern   • Not on file   Social History Narrative    ** Merged History Encounter **          Social Determinants of Health     Financial Resource Strain:    • Difficulty of Paying Living Expenses:    Food Insecurity:    • Worried About Running Out of Food in the Last Year:    • Ran Out of Food in the Last Year:    Transportation Needs:    • Lack of Transportation (Medical):    • Lack of Transportation (Non-Medical):    Physical Activity:    • Days of Exercise per Week:    • Minutes of Exercise per Session:    Stress:    • Feeling of Stress :    Social Connections:    • Frequency of Communication with Friends and Family:    • Frequency of Social Gatherings with Friends and Family:    • Attends Voodoo Services:    • Active Member of Clubs or Organizations:    • Attends Club or Organization Meetings:    • Marital Status:    Intimate Partner Violence:    • Fear of Current or Ex-Partner:    • Emotionally Abused:    • Physically Abused:    • Sexually Abused:        Family History   Problem Relation Age of Onset   • Cancer Mother         ovarian   • Cancer Maternal Grandmother         ovarian       Physical exam:  /72   Wt 66.2 kg (146 lb)     General:appears stated age, is in no apparent distress, is well developed and well nourished  Abdomen: Bowel sounds positive, distended in lower abdomen with fibroid masses palpated 2cm below umbilicus, soft, tender over uterus, no rebound or guarding. No organomegaly.   Female GYN: normal  female external genitalia without lesions, no vaginal discharge, vulva pink without erythema or friability, urethra is normal without discharge or scarring. Cervix appears normal, it is deviated to the left side.  Bimanual palpates enlarged uterus about 18 weeks in size, broad at cervix and on right lower uterine segment it is full, Adnexa not palpated bilaterally.  Skin: No rashes, or ulcers or lesions seen  Psychiatric: Patient shows appropriate affect, is alert and oriented x3, intact judgment and insight.      Assessment  40 y.o.  here for:   1. Abnormal uterine bleeding (AUB)     2. Uterine leiomyoma, unspecified location         Plan  - we discussed the risks and benefits for management of AUB due to fibroid uterus and the patient does not want to try hormone management again.  She would prefer to proceed with hysterectomy.  Explained would perform it robotically though there is a possibility she would need an open hysterectomy if the dissection proves to be difficult or injury occurs of the bladder or ureters.    - She currently has some urgency and stress incontinence, however it is new and worse for her since the increase pressure of the fibroid.  Explained options for fixing this at the time of surgery, but plan will be to evaluate how she recovers once her surgery is complete as it may resolve once the fibroid is removed  - Plan for a RA-TLH, BS, with cysto.  Possibly could use ureteral stents placed at the beginning of the procedure to help in ureteral identification if necessary  - Follow up when ready for surgery

## 2021-04-13 NOTE — NON-PROVIDER
Pt here for new pt appt  Pt states here to follow up on uterine Leiomyoma   Pharmacy verified  Good #:    LMP: irregular   PAP: 08/2020 WNL   BC:  N/a

## 2021-04-13 NOTE — Clinical Note
Surgery: Robotic assisted TLH, BS, cysto  Outpatient  Do you need an assist: Yes  OR Time Needed: 2 hrs  Does pt need preop clearance No   Does pt need preop visit: Yes  Is pt ready to be scheduled immediately (workup complete)? No, Details: Pt is moving, likely end of June or July    Specific date range targeted: Yes, Details: End of June per patient  Special equipment necessary: No

## 2021-05-07 ENCOUNTER — TELEPHONE (OUTPATIENT)
Dept: OBGYN | Facility: CLINIC | Age: 41
End: 2021-05-07

## 2021-05-07 NOTE — TELEPHONE ENCOUNTER
05/07/21 @ 1007 called pt to inform her I cant start her FMLA until her suregery is scheduled. Pt states that it is already scheduled 07/14/21 and that she talked to Brennan about it. Informed pt that I would talk with him real quick and give her a call back. Pt understands and agrees.       05/07/21 @ 1014 Attempted to call pt to inform her that I have talked with Brennan our surgery scheduler, and he states that he is trying to hold the July date for her for a procedure, but Julys schedule is not out yet. He will keep in contact with her to keep her updated.

## 2021-05-07 NOTE — TELEPHONE ENCOUNTER
Pt called stating she did not receive a call back from previous MA but wanted to touch base with her and let her know that surgery date is 7/14/21, per pt's last conversation with Brennan-surgery scheduler. Adv pt Elva is gone for the day but will leave her a message. Pt asked to speak with Brennan, call transferred

## 2021-05-10 ENCOUNTER — TELEPHONE (OUTPATIENT)
Dept: OBGYN | Facility: CLINIC | Age: 41
End: 2021-05-10

## 2021-05-10 NOTE — TELEPHONE ENCOUNTER
05/10/21 @ 1540 Called pt to inform her that we are unable to start her FMLA until we have a set date for her surgery. Pt then asked when we will get the July schedule in. Informed her that we should have it by the end of the month. PT understands and agrees.

## 2021-06-12 ENCOUNTER — HOSPITAL ENCOUNTER (EMERGENCY)
Dept: HOSPITAL 8 - ED | Age: 41
Discharge: HOME | End: 2021-06-12
Payer: COMMERCIAL

## 2021-06-12 VITALS — BODY MASS INDEX: 23.77 KG/M2 | HEIGHT: 66 IN | WEIGHT: 147.93 LBS

## 2021-06-12 VITALS — SYSTOLIC BLOOD PRESSURE: 109 MMHG | DIASTOLIC BLOOD PRESSURE: 62 MMHG

## 2021-06-12 DIAGNOSIS — N63.20: Primary | ICD-10-CM

## 2021-06-12 PROCEDURE — 71045 X-RAY EXAM CHEST 1 VIEW: CPT

## 2021-06-12 PROCEDURE — 99283 EMERGENCY DEPT VISIT LOW MDM: CPT

## 2021-06-12 PROCEDURE — 93005 ELECTROCARDIOGRAM TRACING: CPT

## 2021-06-22 ENCOUNTER — GYNECOLOGY VISIT (OUTPATIENT)
Dept: OBGYN | Facility: CLINIC | Age: 41
End: 2021-06-22
Payer: COMMERCIAL

## 2021-06-22 VITALS — DIASTOLIC BLOOD PRESSURE: 65 MMHG | BODY MASS INDEX: 23.85 KG/M2 | SYSTOLIC BLOOD PRESSURE: 118 MMHG | WEIGHT: 150 LBS

## 2021-06-22 DIAGNOSIS — D25.2 INTRAMURAL, SUBMUCOUS, AND SUBSEROUS LEIOMYOMA OF UTERUS: ICD-10-CM

## 2021-06-22 DIAGNOSIS — D25.9 UTERINE LEIOMYOMA, UNSPECIFIED LOCATION: ICD-10-CM

## 2021-06-22 DIAGNOSIS — D25.1 INTRAMURAL, SUBMUCOUS, AND SUBSEROUS LEIOMYOMA OF UTERUS: ICD-10-CM

## 2021-06-22 DIAGNOSIS — N93.9 ABNORMAL UTERINE BLEEDING (AUB): ICD-10-CM

## 2021-06-22 DIAGNOSIS — D25.0 INTRAMURAL, SUBMUCOUS, AND SUBSEROUS LEIOMYOMA OF UTERUS: ICD-10-CM

## 2021-06-22 PROCEDURE — 99999 PR NO CHARGE: CPT | Performed by: OBSTETRICS & GYNECOLOGY

## 2021-06-22 RX ORDER — IBUPROFEN 800 MG/1
800 TABLET ORAL EVERY 8 HOURS PRN
Qty: 30 TABLET | Refills: 1 | Status: ON HOLD | OUTPATIENT
Start: 2021-06-22 | End: 2021-07-16

## 2021-06-22 RX ORDER — OXYCODONE HYDROCHLORIDE 5 MG/1
5 TABLET ORAL EVERY 4 HOURS PRN
Qty: 15 TABLET | Refills: 0 | Status: SHIPPED | OUTPATIENT
Start: 2021-06-22 | End: 2021-06-27

## 2021-06-22 NOTE — PROGRESS NOTES
"GYN Pre-Op    CC: AUB, fibroid uterus      HPI: Lorna Villa is a 41 y.o.  with AUB and fibroid uterus.  She had a history of c/s x3.  She also has pain and bloating.  Had a grandmother with ovarian cancer at age 50.  Mother had \"early uterine cancer\" at the time of her hysterectomy and also had her ovaries out.   She has failed medical management and plan is for hysterectomy.      Had breast ultrasound for mass on left breast at [facility].  Was told it was fluid filled and has a surgery referral.        ROS:  constitutional: denies fevers, general concerns  CV: denies chest pain, palpitations, edema  Resp: denies shortness of breath, cough  GI: denies abd pain, N/V, diarrhea/constipation, blood in stool  : Reports irregular vaginal bleeding and bloating, denies discharge, reports pain, has worsening urinary complaints and feels its due to her enlarging uterus  Neuro: denies Has, numbness/weakness  Endo: denies significant weight changes, irregular menses, temperature intolerance, denies hotflashes/nightsweats  Heme/lymph: denies hx of blood transfusion, easy bleeding/bruising, denies swollen glands  Psych: denies anxiety/depression  Allergy: denies concerns    OB History    Para Term  AB Living   5 3 3   2 3   SAB TAB Ectopic Molar Multiple Live Births   2         3      # Outcome Date GA Lbr Andi/2nd Weight Sex Delivery Anes PTL Lv   5 Term 02     CS-LTranv   ADRIAN   4 Term 00     CS-LTranv   ADRIAN   3 Term 97     CS-LTranv   ADRIAN   2 SAB            1 SAB                GYN Hx:   Menses are irregular  Denies hx of STIs  Denies hx of abnl paps, last pap 2020     Past Medical History:   Diagnosis Date   • Irregular periods        Past Surgical History:   Procedure Laterality Date   • PRIMARY C SECTION         Medications:   Current Outpatient Medications Ordered in Epic   Medication Sig Dispense Refill   • oxyCODONE immediate-release (ROXICODONE) 5 MG Tab Take 1 tablet by " mouth every four hours as needed for Severe Pain for up to 5 days. 15 tablet 0   • ibuprofen (MOTRIN) 800 MG Tab Take 1 tablet by mouth every 8 hours as needed for Mild Pain. 30 tablet 1   • acetaminophen (TYLENOL) 325 MG Tab Take 650 mg by mouth every four hours as needed.     • multivitamin (THERAGRAN) Tab Take 1 tablet by mouth every day. (Patient not taking: Reported on 6/22/2021)     • hydrOXYzine HCl (ATARAX) 25 MG Tab Take 1 Tab by mouth 3 times a day as needed for Anxiety. (Patient not taking: Reported on 4/13/2021) 30 Tab 3   • ibuprofen (MOTRIN) 200 MG Tab Take 200 mg by mouth every 6 hours as needed. (Patient not taking: Reported on 6/22/2021)       No current Epic-ordered facility-administered medications on file.       Allergies: Lexapro, Demerol, Demerol, and Lexapro    Social History     Socioeconomic History   • Marital status:      Spouse name: Not on file   • Number of children: Not on file   • Years of education: Not on file   • Highest education level: Not on file   Occupational History   • Not on file   Tobacco Use   • Smoking status: Former Smoker     Packs/day: 0.50     Quit date: 2018     Years since quitting: 3.4   • Smokeless tobacco: Never Used   Vaping Use   • Vaping Use: Never used   Substance and Sexual Activity   • Alcohol use: Yes     Alcohol/week: 1.2 oz     Types: 2 Glasses of wine per week     Comment: weekends   • Drug use: Never   • Sexual activity: Yes     Partners: Male   Other Topics Concern   • Not on file   Social History Narrative    ** Merged History Encounter **          Social Determinants of Health     Financial Resource Strain:    • Difficulty of Paying Living Expenses:    Food Insecurity:    • Worried About Running Out of Food in the Last Year:    • Ran Out of Food in the Last Year:    Transportation Needs:    • Lack of Transportation (Medical):    • Lack of Transportation (Non-Medical):    Physical Activity:    • Days of Exercise per Week:    • Minutes of  Exercise per Session:    Stress:    • Feeling of Stress :    Social Connections:    • Frequency of Communication with Friends and Family:    • Frequency of Social Gatherings with Friends and Family:    • Attends Gnosticist Services:    • Active Member of Clubs or Organizations:    • Attends Club or Organization Meetings:    • Marital Status:    Intimate Partner Violence:    • Fear of Current or Ex-Partner:    • Emotionally Abused:    • Physically Abused:    • Sexually Abused:        Family History   Problem Relation Age of Onset   • Cancer Mother         ovarian   • Cancer Maternal Grandmother         ovarian     Denies hx of GI/GYN/breast cancers    Physical Exam:  /65 (BP Location: Left arm, Patient Position: Sitting, BP Cuff Size: Adult)   Wt 68 kg (150 lb)   BMI 23.85 kg/m²   gen: AAO, NAD, affect appropriate  CV: RRR; no LE edema  resp: ctab  abd: soft, NT, ND, no masses, no organomegaly, no hernias  Skin: warm/dry, no lesions  : NEFG, normal urethral meatus, normal anus/perineum, normal vagina and cervix.  Uterus 16-18wk sized, adnexal masses/tenderness unable to be palpated  POP-Q: stage I prolapse      A/P: 41 y.o.  with:  1. Uterine leiomyoma, unspecified location  oxyCODONE immediate-release (ROXICODONE) 5 MG Tab   2. Intramural, submucous, and subserous leiomyoma of uterus     3. Abnormal uterine bleeding (AUB)       F/u for RA-TLH, BS, possible bilateral oophrectomy, cystoscopy and any other indicated procedures    I discussed w/ pt risks of surgery including pain, bleeding, infection, risk of damage to intraabdominal structures including bowel/bladder/ureters.  Pt confirms she is accepting of blood transfusion in case of emergency.  Reviewed risks of transfusion including transfusion reaction (fever, damage to heart/lungs/kidneys), risk of exposure to infectious disease including HIV and hepatitis.  Specific risks to the patient for her surgeries include increased chance of needing open  hysterectomy and increased risks of injury due to her significantly enlarged uterus.    All questions answered.  Consent to be signed on day of surgery    To have nothing to eat after midnight day of surgery.  Can have clear liquids until 2hrs prior to surgery (encouraged to stop 3-4hrs before in case OR schedule changes day of)    Discussed typical recovery, plan for same day discharge.    Increased risk of cancer:  The patient meets criteria for Empower screening for inheritable cancers due to her mothers, great aunts, and grandmothers cancer history.  If she is at increased risk for ovarian cancer, will remove her ovaries at the time of surgery. If no increased risk, will not remove her ovaries due to the importance of hormones for optimal health.          Swati Ruiz D.O.  [facility], Women's Health

## 2021-06-22 NOTE — NON-PROVIDER
Pt. Here for Pre Op visit with  , scheduled for 7/16/2021 @ 3:00pm.   Good # [phone]   Pharmacy verified.

## 2021-06-29 ENCOUNTER — PRE-ADMISSION TESTING (OUTPATIENT)
Dept: ADMISSIONS | Facility: MEDICAL CENTER | Age: 41
End: 2021-06-29
Attending: OBSTETRICS & GYNECOLOGY
Payer: COMMERCIAL

## 2021-06-29 DIAGNOSIS — Z01.812 PRE-OPERATIVE LABORATORY EXAMINATION: ICD-10-CM

## 2021-06-29 DIAGNOSIS — Z01.810 PRE-OPERATIVE CARDIOVASCULAR EXAMINATION: ICD-10-CM

## 2021-06-29 LAB
ANION GAP SERPL CALC-SCNC: 12 MMOL/L (ref 7–16)
BUN SERPL-MCNC: 13 MG/DL (ref 8–22)
CALCIUM SERPL-MCNC: 9.6 MG/DL (ref 8.5–10.5)
CHLORIDE SERPL-SCNC: 109 MMOL/L (ref 96–112)
CO2 SERPL-SCNC: 20 MMOL/L (ref 20–33)
CREAT SERPL-MCNC: 0.54 MG/DL (ref 0.5–1.4)
ERYTHROCYTE [DISTWIDTH] IN BLOOD BY AUTOMATED COUNT: 47.6 FL (ref 35.9–50)
GLUCOSE SERPL-MCNC: 84 MG/DL (ref 65–99)
HCT VFR BLD AUTO: 40 % (ref 37–47)
HGB BLD-MCNC: 10.2 G/DL (ref 12–16)
MCH RBC QN AUTO: 18.7 PG (ref 27–33)
MCHC RBC AUTO-ENTMCNC: 25.5 G/DL (ref 33.6–35)
MCV RBC AUTO: 73.3 FL (ref 81.4–97.8)
PLATELET # BLD AUTO: 248 K/UL (ref 164–446)
PMV BLD AUTO: 10.5 FL (ref 9–12.9)
POTASSIUM SERPL-SCNC: 4 MMOL/L (ref 3.6–5.5)
RBC # BLD AUTO: 5.46 M/UL (ref 4.2–5.4)
SODIUM SERPL-SCNC: 141 MMOL/L (ref 135–145)
WBC # BLD AUTO: 4.5 K/UL (ref 4.8–10.8)

## 2021-06-29 PROCEDURE — 85027 COMPLETE CBC AUTOMATED: CPT

## 2021-06-29 PROCEDURE — 36415 COLL VENOUS BLD VENIPUNCTURE: CPT

## 2021-06-29 PROCEDURE — 80048 BASIC METABOLIC PNL TOTAL CA: CPT

## 2021-07-02 ENCOUNTER — TELEPHONE (OUTPATIENT)
Dept: OBGYN | Facility: CLINIC | Age: 41
End: 2021-07-02

## 2021-07-02 NOTE — TELEPHONE ENCOUNTER
Catie Chris Called from [facility] asking us to contact the patient to let her know we needed another blood sample ASAP. I took a new kit and printed consent form for patient to come and pick it up. Need to go to [facility]. Per Dr Ruiz she needs to get them done ASAP before her surgery. Pt didn't not  phone I left a brief message if any questions to give us a call back.

## 2021-07-07 ENCOUNTER — TELEPHONE (OUTPATIENT)
Dept: OBGYN | Facility: CLINIC | Age: 41
End: 2021-07-07

## 2021-07-07 NOTE — TELEPHONE ENCOUNTER
07/07/21 10:15 AM    Spoke to pt regarding [facility] results insufficient and not enough blood was drawn. Pt frustrated, I apologized. Let pt know that there was another box with paperwork at  for her. Pt appreciative of what we are doing. Pt states either her or her  will be able to  today.

## 2021-07-16 ENCOUNTER — ANESTHESIA EVENT (OUTPATIENT)
Dept: SURGERY | Facility: MEDICAL CENTER | Age: 41
End: 2021-07-16
Payer: COMMERCIAL

## 2021-07-16 ENCOUNTER — HOSPITAL ENCOUNTER (OUTPATIENT)
Facility: MEDICAL CENTER | Age: 41
End: 2021-07-16
Attending: OBSTETRICS & GYNECOLOGY | Admitting: OBSTETRICS & GYNECOLOGY
Payer: COMMERCIAL

## 2021-07-16 ENCOUNTER — ANESTHESIA (OUTPATIENT)
Dept: SURGERY | Facility: MEDICAL CENTER | Age: 41
End: 2021-07-16
Payer: COMMERCIAL

## 2021-07-16 VITALS
WEIGHT: 148.59 LBS | SYSTOLIC BLOOD PRESSURE: 108 MMHG | HEIGHT: 67 IN | OXYGEN SATURATION: 98 % | TEMPERATURE: 98.7 F | HEART RATE: 83 BPM | BODY MASS INDEX: 23.32 KG/M2 | RESPIRATION RATE: 18 BRPM | DIASTOLIC BLOOD PRESSURE: 56 MMHG

## 2021-07-16 DIAGNOSIS — Z80.41 FAMILY HISTORY OF OVARIAN CANCER: ICD-10-CM

## 2021-07-16 DIAGNOSIS — D25.2 INTRAMURAL, SUBMUCOUS, AND SUBSEROUS LEIOMYOMA OF UTERUS: ICD-10-CM

## 2021-07-16 DIAGNOSIS — N93.9 ABNORMAL UTERINE BLEEDING (AUB): ICD-10-CM

## 2021-07-16 DIAGNOSIS — D25.1 INTRAMURAL, SUBMUCOUS, AND SUBSEROUS LEIOMYOMA OF UTERUS: ICD-10-CM

## 2021-07-16 DIAGNOSIS — D25.0 INTRAMURAL, SUBMUCOUS, AND SUBSEROUS LEIOMYOMA OF UTERUS: ICD-10-CM

## 2021-07-16 LAB
HCG UR QL: NEGATIVE
PATHOLOGY CONSULT NOTE: NORMAL

## 2021-07-16 PROCEDURE — 700102 HCHG RX REV CODE 250 W/ 637 OVERRIDE(OP): Performed by: OBSTETRICS & GYNECOLOGY

## 2021-07-16 PROCEDURE — A4358 URINARY LEG OR ABDOMEN BAG: HCPCS | Performed by: OBSTETRICS & GYNECOLOGY

## 2021-07-16 PROCEDURE — 502714 HCHG ROBOTIC SURGERY SERVICES: Performed by: OBSTETRICS & GYNECOLOGY

## 2021-07-16 PROCEDURE — 700111 HCHG RX REV CODE 636 W/ 250 OVERRIDE (IP)

## 2021-07-16 PROCEDURE — 160031 HCHG SURGERY MINUTES - 1ST 30 MINS LEVEL 5: Performed by: OBSTETRICS & GYNECOLOGY

## 2021-07-16 PROCEDURE — 160046 HCHG PACU - 1ST 60 MINS PHASE II: Performed by: OBSTETRICS & GYNECOLOGY

## 2021-07-16 PROCEDURE — 501399 HCHG SPECIMAN BAG, ENDO CATC: Performed by: OBSTETRICS & GYNECOLOGY

## 2021-07-16 PROCEDURE — 160002 HCHG RECOVERY MINUTES (STAT): Performed by: OBSTETRICS & GYNECOLOGY

## 2021-07-16 PROCEDURE — 700105 HCHG RX REV CODE 258: Performed by: OBSTETRICS & GYNECOLOGY

## 2021-07-16 PROCEDURE — 700101 HCHG RX REV CODE 250: Performed by: OBSTETRICS & GYNECOLOGY

## 2021-07-16 PROCEDURE — 700102 HCHG RX REV CODE 250 W/ 637 OVERRIDE(OP)

## 2021-07-16 PROCEDURE — 88307 TISSUE EXAM BY PATHOLOGIST: CPT

## 2021-07-16 PROCEDURE — 58573 TLH W/T/O UTERUS OVER 250 G: CPT | Mod: 80 | Performed by: OBSTETRICS & GYNECOLOGY

## 2021-07-16 PROCEDURE — A9270 NON-COVERED ITEM OR SERVICE: HCPCS | Performed by: OBSTETRICS & GYNECOLOGY

## 2021-07-16 PROCEDURE — 58573 TLH W/T/O UTERUS OVER 250 G: CPT | Performed by: OBSTETRICS & GYNECOLOGY

## 2021-07-16 PROCEDURE — 160009 HCHG ANES TIME/MIN: Performed by: OBSTETRICS & GYNECOLOGY

## 2021-07-16 PROCEDURE — 160035 HCHG PACU - 1ST 60 MINS PHASE I: Performed by: OBSTETRICS & GYNECOLOGY

## 2021-07-16 PROCEDURE — 160025 RECOVERY II MINUTES (STATS): Performed by: OBSTETRICS & GYNECOLOGY

## 2021-07-16 PROCEDURE — 160048 HCHG OR STATISTICAL LEVEL 1-5: Performed by: OBSTETRICS & GYNECOLOGY

## 2021-07-16 PROCEDURE — 501330 HCHG SET, CYSTO IRRIG TUBING: Performed by: OBSTETRICS & GYNECOLOGY

## 2021-07-16 PROCEDURE — 501838 HCHG SUTURE GENERAL: Performed by: OBSTETRICS & GYNECOLOGY

## 2021-07-16 PROCEDURE — A9270 NON-COVERED ITEM OR SERVICE: HCPCS

## 2021-07-16 PROCEDURE — 500868 HCHG NEEDLE, SURGI(VARES): Performed by: OBSTETRICS & GYNECOLOGY

## 2021-07-16 PROCEDURE — 160042 HCHG SURGERY MINUTES - EA ADDL 1 MIN LEVEL 5: Performed by: OBSTETRICS & GYNECOLOGY

## 2021-07-16 PROCEDURE — 81025 URINE PREGNANCY TEST: CPT

## 2021-07-16 PROCEDURE — 700101 HCHG RX REV CODE 250

## 2021-07-16 RX ORDER — CEFAZOLIN SODIUM 1 G/3ML
INJECTION, POWDER, FOR SOLUTION INTRAMUSCULAR; INTRAVENOUS PRN
Status: DISCONTINUED | OUTPATIENT
Start: 2021-07-16 | End: 2021-07-16 | Stop reason: SURG

## 2021-07-16 RX ORDER — ACETAMINOPHEN 500 MG
1000 TABLET ORAL ONCE
Status: COMPLETED | OUTPATIENT
Start: 2021-07-16 | End: 2021-07-16

## 2021-07-16 RX ORDER — IBUPROFEN 800 MG/1
800 TABLET ORAL EVERY 8 HOURS PRN
Qty: 30 TABLET | Refills: 0 | Status: SHIPPED | OUTPATIENT
Start: 2021-07-16 | End: 2022-09-27

## 2021-07-16 RX ORDER — DIPHENHYDRAMINE HYDROCHLORIDE 50 MG/ML
12.5 INJECTION INTRAMUSCULAR; INTRAVENOUS
Status: DISCONTINUED | OUTPATIENT
Start: 2021-07-16 | End: 2021-07-16 | Stop reason: HOSPADM

## 2021-07-16 RX ORDER — OXYCODONE HYDROCHLORIDE AND ACETAMINOPHEN 5; 325 MG/1; MG/1
1 TABLET ORAL
Status: COMPLETED | OUTPATIENT
Start: 2021-07-16 | End: 2021-07-16

## 2021-07-16 RX ORDER — ONDANSETRON 2 MG/ML
INJECTION INTRAMUSCULAR; INTRAVENOUS PRN
Status: DISCONTINUED | OUTPATIENT
Start: 2021-07-16 | End: 2021-07-16 | Stop reason: SURG

## 2021-07-16 RX ORDER — METOCLOPRAMIDE HYDROCHLORIDE 5 MG/ML
INJECTION INTRAMUSCULAR; INTRAVENOUS PRN
Status: DISCONTINUED | OUTPATIENT
Start: 2021-07-16 | End: 2021-07-16 | Stop reason: SURG

## 2021-07-16 RX ORDER — ACETAMINOPHEN 500 MG
1000 TABLET ORAL EVERY 6 HOURS PRN
Qty: 30 TABLET | Refills: 0 | Status: SHIPPED | OUTPATIENT
Start: 2021-07-16 | End: 2022-09-27

## 2021-07-16 RX ORDER — BUPIVACAINE HYDROCHLORIDE AND EPINEPHRINE 2.5; 5 MG/ML; UG/ML
INJECTION, SOLUTION EPIDURAL; INFILTRATION; INTRACAUDAL; PERINEURAL
Status: DISCONTINUED | OUTPATIENT
Start: 2021-07-16 | End: 2021-07-16 | Stop reason: HOSPADM

## 2021-07-16 RX ORDER — ONDANSETRON 2 MG/ML
4 INJECTION INTRAMUSCULAR; INTRAVENOUS
Status: DISCONTINUED | OUTPATIENT
Start: 2021-07-16 | End: 2021-07-16 | Stop reason: HOSPADM

## 2021-07-16 RX ORDER — IBUPROFEN 200 MG
800 TABLET ORAL ONCE
Status: COMPLETED | OUTPATIENT
Start: 2021-07-16 | End: 2021-07-16

## 2021-07-16 RX ORDER — SODIUM CHLORIDE, SODIUM LACTATE, POTASSIUM CHLORIDE, CALCIUM CHLORIDE 600; 310; 30; 20 MG/100ML; MG/100ML; MG/100ML; MG/100ML
INJECTION, SOLUTION INTRAVENOUS CONTINUOUS
Status: ACTIVE | OUTPATIENT
Start: 2021-07-16 | End: 2021-07-16

## 2021-07-16 RX ORDER — DOCUSATE SODIUM 100 MG/1
100 CAPSULE, LIQUID FILLED ORAL 2 TIMES DAILY
Qty: 60 CAPSULE | Refills: 1 | Status: SHIPPED | OUTPATIENT
Start: 2021-07-16 | End: 2022-09-27

## 2021-07-16 RX ORDER — DEXAMETHASONE SODIUM PHOSPHATE 4 MG/ML
INJECTION, SOLUTION INTRA-ARTICULAR; INTRALESIONAL; INTRAMUSCULAR; INTRAVENOUS; SOFT TISSUE PRN
Status: DISCONTINUED | OUTPATIENT
Start: 2021-07-16 | End: 2021-07-16 | Stop reason: SURG

## 2021-07-16 RX ORDER — HALOPERIDOL 5 MG/ML
1 INJECTION INTRAMUSCULAR
Status: DISCONTINUED | OUTPATIENT
Start: 2021-07-16 | End: 2021-07-16 | Stop reason: HOSPADM

## 2021-07-16 RX ORDER — LIDOCAINE HYDROCHLORIDE 20 MG/ML
INJECTION, SOLUTION EPIDURAL; INFILTRATION; INTRACAUDAL; PERINEURAL PRN
Status: DISCONTINUED | OUTPATIENT
Start: 2021-07-16 | End: 2021-07-16 | Stop reason: SURG

## 2021-07-16 RX ORDER — ONDANSETRON 2 MG/ML
4 INJECTION INTRAMUSCULAR; INTRAVENOUS EVERY 4 HOURS PRN
Status: DISCONTINUED | OUTPATIENT
Start: 2021-07-16 | End: 2021-07-16 | Stop reason: HOSPADM

## 2021-07-16 RX ORDER — OXYCODONE HYDROCHLORIDE AND ACETAMINOPHEN 5; 325 MG/1; MG/1
2 TABLET ORAL
Status: COMPLETED | OUTPATIENT
Start: 2021-07-16 | End: 2021-07-16

## 2021-07-16 RX ORDER — PHENAZOPYRIDINE HYDROCHLORIDE 200 MG/1
200 TABLET, FILM COATED ORAL ONCE
Status: COMPLETED | OUTPATIENT
Start: 2021-07-16 | End: 2021-07-16

## 2021-07-16 RX ORDER — HYDROMORPHONE HYDROCHLORIDE 2 MG/ML
INJECTION, SOLUTION INTRAMUSCULAR; INTRAVENOUS; SUBCUTANEOUS PRN
Status: DISCONTINUED | OUTPATIENT
Start: 2021-07-16 | End: 2021-07-16 | Stop reason: SURG

## 2021-07-16 RX ORDER — OXYCODONE HYDROCHLORIDE 5 MG/1
5 TABLET ORAL EVERY 4 HOURS PRN
Qty: 20 TABLET | Refills: 0 | Status: SHIPPED | OUTPATIENT
Start: 2021-07-16 | End: 2021-07-21

## 2021-07-16 RX ADMIN — METOCLOPRAMIDE 10 MG: 5 INJECTION, SOLUTION INTRAMUSCULAR; INTRAVENOUS at 12:28

## 2021-07-16 RX ADMIN — FENTANYL CITRATE 200 MCG: 50 INJECTION, SOLUTION INTRAMUSCULAR; INTRAVENOUS at 12:12

## 2021-07-16 RX ADMIN — HYDROMORPHONE HYDROCHLORIDE 2 MCG: 2 INJECTION, SOLUTION INTRAMUSCULAR; INTRAVENOUS; SUBCUTANEOUS at 14:36

## 2021-07-16 RX ADMIN — ONDANSETRON 8 MG: 2 INJECTION INTRAMUSCULAR; INTRAVENOUS at 12:05

## 2021-07-16 RX ADMIN — ACETAMINOPHEN 1000 MG: 500 TABLET ORAL at 11:39

## 2021-07-16 RX ADMIN — PROPOFOL 200 MG: 10 INJECTION, EMULSION INTRAVENOUS at 12:12

## 2021-07-16 RX ADMIN — FENTANYL CITRATE 50 MCG: 50 INJECTION, SOLUTION INTRAMUSCULAR; INTRAVENOUS at 12:20

## 2021-07-16 RX ADMIN — SODIUM CHLORIDE, POTASSIUM CHLORIDE, SODIUM LACTATE AND CALCIUM CHLORIDE: 600; 310; 30; 20 INJECTION, SOLUTION INTRAVENOUS at 10:41

## 2021-07-16 RX ADMIN — LIDOCAINE HYDROCHLORIDE 25 MG: 20 INJECTION, SOLUTION EPIDURAL; INFILTRATION; INTRACAUDAL at 12:05

## 2021-07-16 RX ADMIN — ROCURONIUM BROMIDE 40 MG: 10 INJECTION, SOLUTION INTRAVENOUS at 12:12

## 2021-07-16 RX ADMIN — ONDANSETRON 4 MG: 2 INJECTION INTRAMUSCULAR; INTRAVENOUS at 17:22

## 2021-07-16 RX ADMIN — SUGAMMADEX 200 MG: 100 INJECTION, SOLUTION INTRAVENOUS at 15:31

## 2021-07-16 RX ADMIN — POVIDONE IODINE 15 ML: 100 SOLUTION TOPICAL at 10:41

## 2021-07-16 RX ADMIN — CEFAZOLIN 2 G: 330 INJECTION, POWDER, FOR SOLUTION INTRAMUSCULAR; INTRAVENOUS at 12:05

## 2021-07-16 RX ADMIN — PHENAZOPYRIDINE HYDROCHLORIDE 200 MG: 200 TABLET ORAL at 10:41

## 2021-07-16 RX ADMIN — DEXAMETHASONE SODIUM PHOSPHATE 8 MG: 4 INJECTION, SOLUTION INTRA-ARTICULAR; INTRALESIONAL; INTRAMUSCULAR; INTRAVENOUS; SOFT TISSUE at 12:06

## 2021-07-16 RX ADMIN — OXYCODONE HYDROCHLORIDE AND ACETAMINOPHEN 2 TABLET: 5; 325 TABLET ORAL at 16:18

## 2021-07-16 RX ADMIN — IBUPROFEN 800 MG: 200 TABLET, FILM COATED ORAL at 11:38

## 2021-07-16 ASSESSMENT — PAIN DESCRIPTION - PAIN TYPE: TYPE: SURGICAL PAIN

## 2021-07-16 NOTE — OR NURSING
Arrived to Phase II after report from Maria G EVANGELISTA.    VSS, complaints of nausea, denies pain. Ambulated from gurney to chair with standby assist.    Surgical site to vagina with kori pad in place, and lap sites to abdomen all C/D/I.    Alarms on and set to appropriate parameters. Call light within reach, rounding in place.

## 2021-07-16 NOTE — OR SURGEON
Immediate Post OP Note    PreOp Diagnosis: AUB, fibroid uterus, family history of ovarian cancer      PostOp Diagnosis: Same      Procedure(s):  HYSTERECTOMY, ROBOT-ASSISTED, USING DA NATHALIE XI - TOTAL, Cystotomy repair - Wound Class: Clean Contaminated  SALPINGO-OOPHORECTOMY - Wound Class: Clean Contaminated  CYSTOSCOPY. - Wound Class: Clean Contaminated    Surgeon(s):  MORIAH Mcnamara D.O.    Anesthesiologist/Type of Anesthesia:  Anesthesiologist: Mario Murillo M.D./General    Surgical Staff:  Circulator: Hieu Turpin R.N.  Relief Circulator: Jeremiah Staton R.N.  Scrub Person: Rola Whitman; Petrona Santana    Specimens removed if any:  ID Type Source Tests Collected by Time Destination   A : Uterus and Cervix Tissue Uterus PATHOLOGY SPECIMEN Swati Ruiz D.O. 7/16/2021  3:17 PM    B : Right ovary and Fallopian Tube Tissue Ovary PATHOLOGY SPECIMEN Swati Ruiz D.O. 7/16/2021  3:17 PM    C : Left Ovary and Fallopian Tube Tissue Ovary PATHOLOGY SPECIMEN Swati Ruiz D.O. 7/16/2021  3:18 PM        Estimated Blood Loss: 200 cc  IV fluids: 2 L  Urine output: 200 cc    Findings: Normal external genitalia, normal vagina and cervix, uterus enlarged and bulky about 18-week in size.  Normal tubes and ovaries bilaterally.  Significant scarring of the bladder well above the lower uterine segment of the uterus.  Cystotomy inadvertently made during the bladder flap dissection which was repaired without difficulty.  Bladder was without masses, lesions, with bilateral efflux noted copiously at the ureteral orifices.  Cystotomy well approximated visually during cystoscopy.    Complications: Cystotomy, repaired        7/16/2021 4:11 PM Swati Ruiz D.O.

## 2021-07-16 NOTE — ANESTHESIA TIME REPORT
Anesthesia Start and Stop Event Times     Date Time Event    7/16/2021 1147 Ready for Procedure     1205 Anesthesia Start     1557 Anesthesia Stop        Responsible Staff  07/16/21    Name Role Begin End    Mario Murillo M.D. Anesth 1205 1557        Preop Diagnosis (Free Text):  Pre-op Diagnosis     ABNORMAL UTERINE BLEEDING, UTERINE LEIOMYOMA UNSPECIFIED LOCATION        Preop Diagnosis (Codes):    Post op Diagnosis  Abnormal uterine bleeding      Premium Reason  A. 3PM - 7AM    Comments:

## 2021-07-16 NOTE — OR NURSING
Report given to Jacinda EVANGELISTA via SBAR reviewed orders and patient history, no questions at this time.  Pt alert and oriented, resp even and nonlabored, in NAD, pt denies pain/nausea at this time. Pt moves all ext, follows commands and verbalized understanding  of poc and discharge/admission. Family notified via text/phone patient is moving to phase II/room. Will con't to monitor until patient has transitioned.

## 2021-07-16 NOTE — OP REPORT
Operative report    PreOp Diagnosis: AUB, fibroid uterus, family history of ovarian cancer      PostOp Diagnosis: Same      Procedure(s):  HYSTERECTOMY, ROBOT-ASSISTED, USING DA NATHALIE XI - TOTAL, Cystotomy repair - Wound Class: Clean Contaminated  SALPINGO-OOPHORECTOMY - Wound Class: Clean Contaminated  CYSTOSCOPY. - Wound Class: Clean Contaminated    Surgeon(s):  MORIAH Mcnamara D.O.    Anesthesiologist/Type of Anesthesia:  Anesthesiologist: Mario Murillo M.D./General    Surgical Staff:  Circulator: Hieu Turpin R.N.  Relief Circulator: Jeremiah Staton R.N.  Scrub Person: Rola Whitman; Petrona Santana    Specimens removed if any:  ID Type Source Tests Collected by Time Destination   A : Uterus and Cervix Tissue Uterus PATHOLOGY SPECIMEN Swati Ruiz D.O. 7/16/2021  3:17 PM    B : Right ovary and Fallopian Tube Tissue Ovary PATHOLOGY SPECIMEN Swati Ruiz D.O. 7/16/2021  3:17 PM    C : Left Ovary and Fallopian Tube Tissue Ovary PATHOLOGY SPECIMEN Swati Ruiz D.O. 7/16/2021  3:18 PM        Estimated Blood Loss: 200 cc  IV fluids: 2 L  Urine output: 200 cc    Findings: Normal external genitalia, normal vagina and cervix, uterus enlarged and bulky about 18-week in size.  Normal tubes and ovaries bilaterally.  Significant scarring of the bladder well above the lower uterine segment of the uterus.  Cystotomy inadvertently made during the bladder flap dissection which was repaired without difficulty.  Bladder was without masses, lesions, with bilateral efflux noted copiously at the ureteral orifices.  Cystotomy well approximated visually during cystoscopy.    Complications: Cystotomy, repaired        PROCEDURE IN DETAIL: Patient is identified in the preoperative area.  Procedure and name are verified.  Informed consent is verified and patient reports all of her questions have been answered to her satisfaction.  The patient was then taken to the operating room  and transferred to the OR table in supine position.  General endotracheal anesthesia was induced without difficulty.  The patient was placed in dorsal lithotomy position with her arms tucked at each side with all pressure points padded.  Serial compression devices were activated.  She was then prepped and draped in the usual sterile fashion. Preoperative antibiotics were given. A timeout was performed in which the patient's identity and procedures to be performed were all agreed upon by all members present.  Examination under anesthesia was performed and a Valladares catheter was placed under sterile technique.  A speculum was placed into the vagina single-tooth tenaculum was used to grasp the cervix.  The uterus was sounded to 7 cm as it could not pass further and dilated to accommodate the [employer] uterine manipulator.  Attention was turned to the abdominal portion of the case.  Approximately 5 cc of quarter percent Marcaine was injected into the base of the umbilicus.   A Veress needle was used to enter the peritoneal cavity and proper placement was noted with a normal saline drop test.  Gas was turned on and there was an opening pressure of 4 mmHg.  Pressure was then turned to high flow and the abdomen was insufflated with CO2 gas at a pressure of 15 mmHg and pneumoperitoneum was maintained.  An umbilical incision was then created to accommodate an 8 millimeter robotic trocar. The 8 millimeter robotic trocar was inserted into the abdomen. The scope was introduced to confirm intraperitoneal placement with no injuries. 4 additional trocars were then all placed under direct visualization after injection with marcaine. The patient was placed in steep Trendelenburg positioning to assist with visualization of the pelvis and the robot was docked per the routine.  2 bipolar grasper and monopolar scissors were used for the case.  The surgeon moved to the console and evaluated the abdomen with the above findings noted.  The  uterus filled the entire lower 2/3 of the abdominal cavity and was as wide as the side walls.  Bilateral ureters were visualized transperitoneally to be coursing in the normal anatomic position.  Starting on the left, the uteroovarian ligament was coagulated and transected with hemostasis maintained.  The round ligament was then taken down. This allowed entry into the retroperitoneal space. The vaginal assistant then placed the uterus on cephalad traction so that the KOH ring impression could be seen within the vagina. The leafs of the broad ligament were then carefully dissected.  This was performed anteriorly first to create the bladder flap and drop the bladder down off of the anterior surface of the uterus and cervix.  This was met with significant resistance due to her previous  sections.  There was thick scarring noted all the way up over the lower uterine segment and the cup could not be palpated anteriorly.  Careful dissection was performed however during this process a cystotomy occurred that was about 2 to 3 cm in total length.  This was closed with 4-0 Vicryl and imbricated with 3-0 Vicryl.  The bladder was backfilled and it was noted that the seal was watertight.  We kept the bladder filled for further dissection so that the vesicouterine space was better identified and the bladder was removed from the uterus and cervix until the surface of the KOH ring impression could be seen. The uterine arteries were then skeletonized. The uterine artery were taken with bipolar cautery and then transected. Similar procedure was then carried out on the right.  A colpotomy was created around the vaginal mucosa with the monopolar. Once the colpotomy was completed, the uterine manipulator was removed from the uterus and vagina.  A extra-large Endo Catch bag was placed into the vagina and the uterus was placed into the bag.  The bag was pulled to the vagina and morcellation was done vaginally until the entire  uterus and cervix was removed.  The uterus and cervix weighed 816 g in total.  Attention was turned back to the robot inside the abdomen, and bilateral adnexa were transected at the IP ligament and removed through the vagina.  Uterus, cervix, and bilateral tubes were sent to pathology. The cuff was irrigated.  The cuff was noted to be hemostatic. The bladder was further dissected off the anterior vaginal tissue to assist with vaginal cuff closure. The vaginal cuff was closed with running suture of 0-V-Loc in a single layer with incorporation of the uterosacral ligaments.  Pelvis and cuff were all irrigated. All surgical sites were noted to be hemostatic. The robot was then undocked and backed safely away from the patient.      The sharp was removed from the bladder and attention was turned to the cystoscopy.  Cystoscopy was performed with a full bladder survey revealing no foreign objects in the bladder.  The small defect was easily visualized with sutures intact and was hemostatic.  It was noted to be in the anterior dome portion of the bladder far away from the ureters.  Bilateral ureteral reflux was noted.  The sharp was replaced.  Vaginal cuff was inspected and confirmed to be well approximated and hemostatic.  No vaginal lacerations noted.      Gloves were changed and the skin incisions were closed with 4-0 monocryl subcuticular fashion. Dermabond then applied. The counts were all correct.      Sponge, needle, instrument, and lap counts were correct x2. Patient tolerated the procedure well and went to recovery room in stable condition.         ____________________________________         Swati Ruiz D.O.

## 2021-07-16 NOTE — ANESTHESIA POSTPROCEDURE EVALUATION
Patient: Lorna Villa    Procedure Summary     Date: 07/16/21 Room / Location: [place] [id number] / SURGERY [facility]    Anesthesia Start: 1205 Anesthesia Stop: 1557    Procedures:       HYSTERECTOMY, ROBOT-ASSISTED, USING DA NATHALIE XI - TOTAL (N/A Abdomen)      SALPINGO-OOPHORECTOMY (Bilateral Abdomen)      CYSTOSCOPY. (N/A Bladder) Diagnosis: (ABNORMAL UTERINE BLEEDING, UTERINE LEIOMYOMA )    Surgeons: Swati Ruiz D.O. Responsible Provider: Mario Murillo M.D.    Anesthesia Type: general ASA Status: 2          Final Anesthesia Type: general  Last vitals  BP   Blood Pressure: 108/66    Temp   36.9 °C (98.5 °F)    Pulse   100   Resp   14    SpO2   100 %      Anesthesia Post Evaluation    Patient location during evaluation: PACU  Patient participation: complete - patient participated  Level of consciousness: awake and alert    Airway patency: patent  Anesthetic complications: no  Cardiovascular status: hemodynamically stable  Respiratory status: acceptable  Hydration status: euvolemic    PONV: none          No complications documented.     Nurse Pain Score: 0 (NPRS)

## 2021-07-16 NOTE — DISCHARGE INSTRUCTIONS
ACTIVITY: Rest and take it easy for the first 24 hours.  A responsible adult is recommended to remain with you during that time.  It is normal to feel sleepy.  We encourage you to not do anything that requires balance, judgment or coordination.    MILD FLU-LIKE SYMPTOMS ARE NORMAL. YOU MAY EXPERIENCE GENERALIZED MUSCLE ACHES, THROAT IRRITATION, HEADACHE AND/OR SOME NAUSEA.    FOR 24 HOURS DO NOT:  Drive, operate machinery or run household appliances.  Drink beer or alcoholic beverages.   Make important decisions or sign legal documents.    Laparoscopically Assisted Vaginal Hysterectomy, Care After  This sheet gives you information about how to care for yourself after your procedure. Your health care provider may also give you more specific instructions. If you have problems or questions, contact your health care provider.  What can I expect after the procedure?  After the procedure, it is common to have:  · Soreness and numbness in your incision areas.  · Abdominal pain. You will be given pain medicine to control it.  · Vaginal bleeding and discharge. You will need to use a sanitary napkin after this procedure.  · Sore throat from the breathing tube that was inserted during surgery.  Follow these instructions at home:  Medicines  · Take over-the-counter and prescription medicines only as told by your health care provider.  · Do not take aspirin or ibuprofen. These medicines can cause bleeding.  · Do not drive or use heavy machinery while taking prescription pain medicine.  · Do not drive for 24 hours if you were given a medicine to help you relax (sedative) during the procedure.  Incision care  · Follow instructions from your health care provider about how to take care of your incisions. Make sure you:  ? Wash your hands with soap and water before you change your bandage (dressing). If soap and water are not available, use hand .  ? Change your dressing as told by your health care provider.  ? Leave  stitches (sutures), skin glue, or adhesive strips in place. These skin closures may need to stay in place for 2 weeks or longer. If adhesive strip edges start to loosen and curl up, you may trim the loose edges. Do not remove adhesive strips completely unless your health care provider tells you to do that.  · Check your incision area every day for signs of infection. Check for:  ? Redness, swelling, or pain.  ? Fluid or blood.  ? Warmth.  ? Pus or a bad smell.  Activity  · Get regular exercise as told by your health care provider. You may be told to take short walks every day and go farther each time.  · Return to your normal activities as told by your health care provider. Ask your health care provider what activities are safe for you.  · Do not douche, use tampons, or have sexual intercourse for at least 6 weeks, or until your health care provider gives you permission.  · Do not lift anything that is heavier than 10 lb (4.5 kg), or the limit that your health care provider tells you, until he or she says that it is safe.  General instructions  · Do not take baths, swim, or use a hot tub until your health care provider approves. Take showers instead of baths.  · Do not drive for 24 hours if you received a sedative.  · Do not drive or operate heavy machinery while taking prescription pain medicine.  · To prevent or treat constipation while you are taking prescription pain medicine, your health care provider may recommend that you:  ? Drink enough fluid to keep your urine clear or pale yellow.  ? Take over-the-counter or prescription medicines.  ? Eat foods that are high in fiber, such as fresh fruits and vegetables, whole grains, and beans.  ? Limit foods that are high in fat and processed sugars, such as fried and sweet foods.  · Keep all follow-up visits as told by your health care provider. This is important.  Contact a health care provider if:  · You have signs of infection, such as:  ? Redness, swelling, or pain  around your incision sites.  ? Fluid or blood coming from an incision.  ? An incision that feels warm to the touch.  ? Pus or a bad smell coming from an incision.  · Your incision breaks open.  · Your pain medicine is not helping.  · You feel dizzy or light-headed.  · You have pain or bleeding when you urinate.  · You have persistent nausea and vomiting.  · You have blood, pus, or a bad-smelling discharge from your vagina.  Get help right away if:  · You have a fever.  · You have severe abdominal pain.  · You have chest pain.  · You have shortness of breath.  · You faint.  · You have pain, swelling, or redness in your leg.  · You have heavy bleeding from your vagina.  Summary  · After the procedure, it is common to have abdominal pain and vaginal bleeding.  · You should not drive or lift heavy objects until your health care provider says that it is safe.  · Contact your health care provider if you have any symptoms of infection, excessive vaginal bleeding, nausea, vomiting, or shortness of breath.  This information is not intended to replace advice given to you by your health care provider. Make sure you discuss any questions you have with your health care provider.    Indwelling Urinary Catheter Care, Adult  An indwelling urinary catheter is a thin, flexible, germ-free (sterile) tube that is placed into the bladder to help drain urine out of the body. The catheter is inserted into the part of the body that drains urine from the bladder (urethra). Urine drains from the catheter into a drainage bag outside of the body.  Taking good care of your catheter will keep it working properly and help to prevent problems from developing.  What are the risks?  · Bacteria may get into your bladder and cause a urinary tract infection.  · Urine flow can become blocked. This can happen if the catheter is not working correctly, or if you have sediment or a blood clot in your bladder or the catheter.  · Tissue near the catheter may  become irritated and bleed.  How to wear your catheter and your drainage bag  Supplies needed  · Adhesive tape or a leg strap.  · Alcohol wipe or soap and water (if you use tape).  · A clean towel (if you use tape).  · Overnight drainage bag.  · Smaller drainage bag (leg bag).  Wearing your catheter and bag  Use adhesive tape or a leg strap to attach your catheter to your leg.  · Make sure the catheter is not pulled tight.  · If a leg strap gets wet, replace it with a dry one.  · If you use adhesive tape:  1. Use an alcohol wipe or soap and water to wash off any stickiness on your skin where you had tape before.  2. Use a clean towel to pat-dry the area.  3. Apply the new tape.  You should have received a large overnight drainage bag and a smaller leg bag that fits underneath clothing.  · You may wear the overnight bag at any time, but you should not wear the leg bag at night.  · Always wear the leg bag below your knee.  · Make sure the overnight drainage bag is always lower than the level of your bladder, but do not let it touch the floor. Before you go to sleep, hang the bag inside a wastebasket that is covered by a clean plastic bag.  How to care for your skin around the catheter  Supplies needed  · A clean washcloth.  · Water and mild soap.  · A clean towel.  Caring for your skin and catheter  · Every day, use a clean washcloth and soapy water to clean the skin around your catheter.  ? Wash your hands with soap and water.  ? Wet a washcloth in warm water and mild soap.  ? Clean the skin around your urethra.  ? If you are female:  ? Use one hand to gently spread the folds of skin around your vagina (labia).  ? With the washcloth in your other hand, wipe the inner side of your labia on each side. Do this in a front-to-back direction.  ? If you are male:  ? Use one hand to pull back any skin that covers the end of your penis (foreskin).  ? With the washcloth in your other hand, wipe your penis in small circles.  Start wiping at the tip of your penis, then move outward from the catheter.  ? Move the foreskin back in place, if this applies.  ? With your free hand, hold the catheter close to where it enters your body. Keep holding the catheter during cleaning so it does not get pulled out.  ? Use your other hand to clean the catheter with the washcloth.  ? Only wipe downward on the catheter.  ? Do not wipe upward toward your body, because that may push bacteria into your urethra and cause infection.  ? Use a clean towel to pat-dry the catheter and the skin around it. Make sure to wipe off all soap.  ? Wash your hands with soap and water.  · Shower every day. Do not take baths.  · Do not use cream, ointment, or lotion on the area where the catheter enters your body, unless your health care provider tells you to do that.  · Do not use powders, sprays, or lotions on your genital area.  · Check your skin around the catheter every day for signs of infection. Check for:  ? Redness, swelling, or pain.  ? Fluid or blood.  ? Warmth.  ? Pus or a bad smell.  How to empty the drainage bag  Supplies needed  · Rubbing alcohol.  · Gauze pad or cotton ball.  · Adhesive tape or a leg strap.  Emptying the bag  Empty your drainage bag (your overnight drainage bag or your leg bag) when it is ?-½ full, or at least 2-3 times a day. Clean the drainage bag according to the 's instructions or as told by your health care provider.  1. Wash your hands with soap and water.  2. Detach the drainage bag from your leg.  3. Hold the drainage bag over the toilet or a clean container. Make sure the drainage bag is lower than your hips and bladder. This stops urine from going back into the tubing and into your bladder.  4. Open the pour spout at the bottom of the bag.  5. Empty the urine into the toilet or container. Do not let the pour spout touch any surface. This precaution is important to prevent bacteria from getting in the bag and causing  infection.  6. Apply rubbing alcohol to a gauze pad or cotton ball.  7. Use the gauze pad or cotton ball to clean the pour spout.  8. Close the pour spout.  9. Attach the bag to your leg with adhesive tape or a leg strap.  10. Wash your hands with soap and water.  How to change the drainage bag  Supplies needed:  · Alcohol wipes.  · A clean drainage bag.  · Adhesive tape or a leg strap.  Changing the bag  Replace your drainage bag with a clean bag if it leaks, starts to smell bad, or looks dirty.  1. Wash your hands with soap and water.  2. Detach the dirty drainage bag from your leg.  3. Pinch the catheter with your fingers so that urine does not spill out.  4. Disconnect the catheter tube from the drainage tube at the connection valve. Do not let the tubes touch any surface.  5. Clean the end of the catheter tube with an alcohol wipe. Use a different alcohol wipe to clean the end of the drainage tube.  6. Connect the catheter tube to the drainage tube of the clean bag.  7. Attach the clean bag to your leg with adhesive tape or a leg strap. Avoid attaching the new bag too tightly.  8. Wash your hands with soap and water.  General instructions  · Never pull on your catheter or try to remove it. Pulling can damage your internal tissues.  · Always wash your hands before and after you handle your catheter or drainage bag. Use a mild, fragrance-free soap. If soap and water are not available, use hand .  · Always make sure there are no twists or bends (kinks) in the catheter tube.  · Always make sure there are no leaks in the catheter or drainage bag.  · Drink enough fluid to keep your urine pale yellow.  · Do not take baths, swim, or use a hot tub.  · If you are female, wipe from front to back after having a bowel movement.  Contact a health care provider if:  · Your urine is cloudy.  · Your urine smells unusually bad.  · Your catheter gets clogged.  · Your catheter starts to leak.  · Your bladder feels  full.  Get help right away if:  · You have redness, swelling, or pain where the catheter enters your body.  · You have fluid, blood, pus, or a bad smell coming from the area where the catheter enters your body.  · The area where the catheter enters your body feels warm to the touch.  · You have a fever.  · You have pain in your abdomen, legs, lower back, or bladder.  · You see blood in the catheter.  · Your urine is pink or red.  · You have nausea, vomiting, or chills.  · Your urine is not draining into the bag.  · Your catheter gets pulled out.  Summary  · An indwelling urinary catheter is a thin, flexible, germ-free (sterile) tube that is placed into the bladder to help drain urine out of the body.  · The catheter is inserted into the part of the body that drains urine from the bladder (urethra).  · Take good care of your catheter to keep it working properly and help prevent problems from developing.  · Always wash your hands before and after you handle your catheter or drainage bag.  · Never pull on your catheter or try to remove it.  This information is not intended to replace advice given to you by your health care provider. Make sure you discuss any questions you have with your health care provider.    DIET: To avoid nausea, slowly advance diet as tolerated, avoiding spicy or greasy foods for the first day.  Add more substantial food to your diet according to your physician's instructions.  Babies can be fed formula or breast milk as soon as they are hungry.  INCREASE FLUIDS AND FIBER TO AVOID CONSTIPATION.    SURGICAL DRESSING/BATHING: OK to shower, no submerging in water (baths, pools, hot tubs, etc.) until cleared by Dr. Ruiz.    FOLLOW-UP APPOINTMENT: Follow-up on Monday to get catheter removed.    You should CALL YOUR PHYSICIAN if you develop:  Fever greater than 101 degrees F.  Pain not relieved by medication, or persistent nausea or vomiting.  Excessive bleeding (blood soaking through dressing) or  unexpected drainage from the wound.  Extreme redness or swelling around the incision site, drainage of pus or foul smelling drainage.  Inability to urinate or empty your bladder within 8 hours.  Problems with breathing or chest pain.    You should call 911 if you develop problems with breathing or chest pain.  If you are unable to contact your doctor or surgical center, you should go to the nearest emergency room or urgent care center.  Physician's telephone #: Dr. Ruiz [phone]    If any questions arise, call your doctor.  If your doctor is not available, please feel free to call the Surgical Center at [phone]. The Contact Center is open Monday through Friday 7AM to 5PM and may speak to a nurse at [phone], or toll free at [phone].     A registered nurse may call you a few days after your surgery to see how you are doing after your procedure.    MEDICATIONS: Resume taking daily medication.  Take prescribed pain medication with food.  If no medication is prescribed, you may take non-aspirin pain medication if needed.  PAIN MEDICATION CAN BE VERY CONSTIPATING.  Take a stool softener or laxative such as senokot, pericolace, or milk of magnesia if needed.    Prescriptions for Motrin, Tylenol, oxycodone, and estradiol patch sent to pharmacy.  Last pain medication given at 4:18 pm.    If your physician has prescribed pain medication that includes Acetaminophen (Tylenol), do not take additional Acetaminophen (Tylenol) while taking the prescribed medication.    Depression / Suicide Risk    As you are discharged from this [facility] facility, it is important to learn how to keep safe from harming yourself.    Recognize the warning signs:  · Abrupt changes in personality, positive or negative- including increase in energy   · Giving away possessions  · Change in eating patterns- significant weight changes-  positive or negative  · Change in sleeping patterns- unable to sleep or sleeping all the  time   · Unwillingness or inability to communicate  · Depression  · Unusual sadness, discouragement and loneliness  · Talk of wanting to die  · Neglect of personal appearance   · Rebelliousness- reckless behavior  · Withdrawal from people/activities they love  · Confusion- inability to concentrate     If you or a loved one observes any of these behaviors or has concerns about self-harm, here's what you can do:  · Talk about it- your feelings and reasons for harming yourself  · Remove any means that you might use to hurt yourself (examples: pills, rope, extension cords, firearm)  · Get professional help from the community (Mental Health, Substance Abuse, psychological counseling)  · Do not be alone:Call your Safe Contact- someone whom you trust who will be there for you.  · Call your local CRISIS HOTLINE 277-7224 or 646-168-8720  · Call your local Children's Mobile Crisis Response Team [place] (825) 914-2733 or www.[employer]  · Call the toll free National Suicide Prevention Hotlines   · National Suicide Prevention Lifeline 244-786-AKRT (7625)  · National Hope Line Network 800-SUICIDE (773-2597)

## 2021-07-16 NOTE — ANESTHESIA PROCEDURE NOTES
Airway    Date/Time: 7/16/2021 12:21 PM  Performed by: Mario Murillo M.D.  Authorized by: Mario Murillo M.D.     Location:  OR  Urgency:  Elective  Indications for Airway Management:  Anesthesia      Spontaneous Ventilation: absent    Sedation Level:  Deep  Preoxygenated: Yes    Patient Position:  Sniffing  Final Airway Type:  Endotracheal airway  Final Endotracheal Airway:  ETT  Cuffed: Yes    Technique Used for Successful ETT Placement:  Direct laryngoscopy    Insertion Site:  Oral  Blade Type:  Kirill  Laryngoscope Blade/Videolaryngoscope Blade Size:  3  ETT Size (mm):  7.5  Measured from:  Teeth  ETT to Teeth (cm):  22  Placement Verified by: auscultation and capnometry    Cormack-Lehane Classification:  Grade I - full view of glottis  Number of Attempts at Approach:  1

## 2021-07-17 NOTE — OR NURSING
Patient dressed and wants to just go home. Instructions reviewed for post op instructions as well as sharp instructions. Patient states that she knows how to do sharp care and drain it. Denies pain but has some nausea, medicated per the mar. Patient ready to go, last vital signs in flow sheet. PIV removed. Patient taken to car in wheelchair. Patient safe to discharge.

## 2021-07-28 ENCOUNTER — PATIENT MESSAGE (OUTPATIENT)
Dept: OBGYN | Facility: CLINIC | Age: 41
End: 2021-07-28

## 2021-08-02 ENCOUNTER — GYNECOLOGY VISIT (OUTPATIENT)
Dept: OBGYN | Facility: CLINIC | Age: 41
End: 2021-08-02
Payer: COMMERCIAL

## 2021-08-02 VITALS — DIASTOLIC BLOOD PRESSURE: 64 MMHG | SYSTOLIC BLOOD PRESSURE: 101 MMHG | WEIGHT: 147 LBS | BODY MASS INDEX: 23.37 KG/M2

## 2021-08-02 DIAGNOSIS — R31.9 HEMATURIA, UNSPECIFIED TYPE: ICD-10-CM

## 2021-08-02 DIAGNOSIS — Z48.89 POSTOPERATIVE VISIT: ICD-10-CM

## 2021-08-02 LAB
APPEARANCE UR: NORMAL
BILIRUB UR STRIP-MCNC: NORMAL MG/DL
COLOR UR AUTO: YELLOW
GLUCOSE UR STRIP.AUTO-MCNC: NORMAL MG/DL
KETONES UR STRIP.AUTO-MCNC: NORMAL MG/DL
LEUKOCYTE ESTERASE UR QL STRIP.AUTO: NORMAL
NITRITE UR QL STRIP.AUTO: NORMAL
PH UR STRIP.AUTO: NORMAL [PH] (ref 5–8)
PROT UR QL STRIP: NORMAL MG/DL
RBC UR QL AUTO: NORMAL
SP GR UR STRIP.AUTO: NORMAL
UROBILINOGEN UR STRIP-MCNC: NORMAL MG/DL

## 2021-08-02 PROCEDURE — 81002 URINALYSIS NONAUTO W/O SCOPE: CPT | Performed by: OBSTETRICS & GYNECOLOGY

## 2021-08-02 PROCEDURE — 99024 POSTOP FOLLOW-UP VISIT: CPT | Performed by: OBSTETRICS & GYNECOLOGY

## 2021-08-02 NOTE — NON-PROVIDER
Pt here today for post op exam; Hysterectomy   Surgery Date: 7/16/2021  PT states blood in urine for 3 days.   Good ph [phone]   Pharmacy verified

## 2021-08-02 NOTE — LETTER
August 2, 2021    To Whom It May Concern:         This is confirmation that Lorna Abrams Daisy attended her scheduled appointment with Swati Ruiz D.O. on 8/02/21. Lorna is cleared to return to work light, no lifting.         If you have any questions please do not hesitate to call me at the phone number listed below.    Sincerely,          Swati Ruiz D.O.  [phone]

## 2021-08-02 NOTE — PROGRESS NOTES
GYN Visit:    CC: postop check    HPI: 41 y.o.yo  s/p RA-TLH, BSO, repair of cystotomy, cystoscopy on 2021 with me for AUB, fibroid uterus.    Pt reports doing well.  Minimal pain.  Minimal discharge that is pink/yellow in color.  No problems with urination or bowel movement.  Does not have the sensation to go pee, has to remind herself to go to the bathroom.    ROS:   Gen: denies fevers, general concerns  Abd: denies abd pain, N/V, constipation  : denies vaginal bleeding, discharge    /64 (BP Location: Left arm, Patient Position: Sitting, BP Cuff Size: Adult)   Wt 66.7 kg (147 lb)   BMI 23.37 kg/m²   Gen: AAO, NAD  Abd: soft, NT, ND, incisions healing well    : Normal external genitalia, normal vagina, vaginal cuff appears intact, normal healing discharged after surgery, bimanual exam shows intact vaginal cuff with no localized swelling or concern for seroma    A/P:41 y.o.yo  s/p RA-TLH, BSO, repair of cystotomy, cystoscopy on 2021 with me for AUB, fibroid uterus.  - pathology benign, reviewed as above  -Discussed endometriosis diagnosis seen visually and on histology  - no signs of postop complications  -Continue to have timed voids to allow for bladder incision to heal  -Patient thought she had hematuria, it is coming from the vagina and is normal, reassurance provided    F/u: In 4 to 6 weeks for final postoperative exam    Swati Ruiz D.O.  [facility], Women's Health

## 2022-06-19 ENCOUNTER — OFFICE VISIT (OUTPATIENT)
Dept: URGENT CARE | Facility: PHYSICIAN GROUP | Age: 42
End: 2022-06-19
Payer: COMMERCIAL

## 2022-06-19 VITALS
HEART RATE: 78 BPM | RESPIRATION RATE: 14 BRPM | HEIGHT: 68 IN | TEMPERATURE: 96.9 F | WEIGHT: 147 LBS | BODY MASS INDEX: 22.28 KG/M2 | DIASTOLIC BLOOD PRESSURE: 72 MMHG | SYSTOLIC BLOOD PRESSURE: 118 MMHG | OXYGEN SATURATION: 98 %

## 2022-06-19 DIAGNOSIS — N95.1 MENOPAUSAL SYMPTOMS: ICD-10-CM

## 2022-06-19 DIAGNOSIS — Z76.0 ENCOUNTER FOR MEDICATION REFILL: ICD-10-CM

## 2022-06-19 PROCEDURE — 99213 OFFICE O/P EST LOW 20 MIN: CPT | Performed by: FAMILY MEDICINE

## 2022-06-19 RX ORDER — ESTRADIOL 0.07 MG/D
1 PATCH TRANSDERMAL
Qty: 4 PATCH | Refills: 1 | Status: SHIPPED | OUTPATIENT
Start: 2022-06-19 | End: 2022-07-25 | Stop reason: SDUPTHER

## 2022-06-19 NOTE — PROGRESS NOTES
"Chief Complaint:    Chief Complaint   Patient presents with   • Medication Refill     Patch estradiol        History of Present Illness:     present. S/p hysterectomy + BSO on 7/16/21 due to AUB, fibroid uterus, family history of ovarian cancer, OP report reviewed. Requesting refill of Estradiol patch - currently using 0.1 mg/24 hr weekly, last rx'd by GYN #4, 11 RF on 7/16/21. She has the last patch on today and is supposed to be changed out but does not have any more supply. If she is not using the patch, she gets vazquez. She requested refill of this from GYN on 6/15/22 and 6/17/22, but was denied due to \"Patient no longer under provider care\". She does not plan on seeing this GYN anymore due to persistent post-op symptoms. Patient plans to see her PCP for future refills. She prefers to go to lower dose today, would like to go down to 0.075 mg/24 hr patch.      Past Medical History:    Past Medical History:   Diagnosis Date   • Anemia    • Anesthesia     Severe shakes   • Arrhythmia     Feels intermittent rapid heart rate   • Irregular periods    • Urinary incontinence     stress incontinence     Past Surgical History:    Past Surgical History:   Procedure Laterality Date   • AL CYSTOURETHROSCOPY N/A 7/16/2021    Procedure: CYSTOSCOPY.;  Surgeon: Swati Ruiz D.O.;  Location: SURGERY [facility];  Service: Gyn Robotic   • HYSTERECTOMY ROBOTIC XI N/A 7/16/2021    Procedure: HYSTERECTOMY, ROBOT-ASSISTED, USING DA NATHALIE XI - TOTAL, Cystotomy repair;  Surgeon: Swati Ruiz D.O.;  Location: SURGERY [facility];  Service: Gyn Robotic   • SALPINGO OOPHORECTOMY Bilateral 7/16/2021    Procedure: SALPINGO-OOPHORECTOMY;  Surgeon: Swati Ruiz D.O.;  Location: SURGERY [facility];  Service: Gyn Robotic   • REPEAT C SECTION  12/2002   • REPEAT C SECTION  03/2000   • PRIMARY C SECTION  12/1997     Social History:    Social History     Socioeconomic History   • Marital status:      Spouse name: Not " "on file   • Number of children: Not on file   • Years of education: Not on file   • Highest education level: Not on file   Occupational History   • Not on file   Tobacco Use   • Smoking status: Former Smoker     Packs/day: 0.50     Quit date:      Years since quittin.4   • Smokeless tobacco: Never Used   Vaping Use   • Vaping Use: Never used   Substance and Sexual Activity   • Alcohol use: Yes     Alcohol/week: 1.2 oz     Types: 2 Glasses of wine per week     Comment: weekends   • Drug use: Never   • Sexual activity: Yes     Partners: Male   Other Topics Concern   • Not on file   Social History Narrative    ** Merged History Encounter **          Social Determinants of Health     Financial Resource Strain: Not on file   Food Insecurity: Not on file   Transportation Needs: Not on file   Physical Activity: Not on file   Stress: Not on file   Social Connections: Not on file   Intimate Partner Violence: Not on file   Housing Stability: Not on file     Family History:    Family History   Problem Relation Age of Onset   • Cancer Mother         ovarian   • Cancer Maternal Grandmother         ovarian     Medications:    Current Outpatient Medications on File Prior to Visit   Medication Sig Dispense Refill   • ibuprofen (MOTRIN) 800 MG Tab Take 1 tablet by mouth every 8 hours as needed. 30 tablet 0   • acetaminophen (TYLENOL) 500 MG Tab Take 2 Tablets by mouth every 6 hours as needed for Mild Pain. 30 tablet 0   • docusate sodium (COLACE) 100 MG Cap Take 1 capsule by mouth 2 times a day. 60 capsule 1   • estradiol (CLIMARA) 0.1 MG/24HR PATCH WEEKLY Place 1 Patch on the skin every 7 days. 4 Patch 11     No current facility-administered medications on file prior to visit.     Allergies:    Allergies   Allergen Reactions   • Demerol Hives   • Lexapro Anaphylaxis     \"go blind\" shock       Vitals:    Vitals:    22 1249   BP: 118/72   BP Location: Left arm   Patient Position: Sitting   BP Cuff Size: Adult   Pulse: " "78   Resp: 14   Temp: 36.1 °C (96.9 °F)   TempSrc: Temporal   SpO2: 98%   Weight: 66.7 kg (147 lb)   Height: 1.727 m (5' 8\")       Physical Exam:    Constitutional: Vital signs reviewed. Appears well-developed and well-nourished. No acute distress.   Eyes: Sclera white, conjunctivae clear.  ENT: External ears normal. Hearing normal.   Neck: Neck supple.   Pulmonary/Chest: Respirations non-labored.  Musculoskeletal: Normal gait. No muscular atrophy or weakness.  Neurological: Alert and oriented to person, place, and time. Muscle tone normal. Coordination normal.   Skin: No rashes or lesions. Warm, dry, normal turgor.  Psychiatric: Normal mood and affect. Behavior is normal. Judgment and thought content normal.       Medical Decision Makin. Menopausal symptoms  - estradiol (CLIMARA) 0.075 MG/24HR PATCH WEEKLY; Place 1 Patch on the skin every 7 days.  Dispense: 4 Patch; Refill: 1    2. Encounter for medication refill  - estradiol (CLIMARA) 0.075 MG/24HR PATCH WEEKLY; Place 1 Patch on the skin every 7 days.  Dispense: 4 Patch; Refill: 1      Discussed with them DDX, management options, and risks, benefits, and alternatives to treatment plan agreed upon.     present. S/p hysterectomy + BSO on 21 due to AUB, fibroid uterus, family history of ovarian cancer, OP report reviewed. Requesting refill of Estradiol patch - currently using 0.1 mg/24 hr weekly, last rx'd by GYN #4, 11 RF on 21. She has the last patch on today and is supposed to be changed out but does not have any more supply. If she is not using the patch, she gets vazquez. She requested refill of this from GYN on 6/15/22 and 22, but was denied due to \"Patient no longer under provider care\". She does not plan on seeing this GYN anymore due to persistent post-op symptoms. Patient plans to see her PCP for future refills. She prefers to go to lower dose today, would like to go down to 0.075 mg/24 hr patch.    Requested medication " prescribed. She will see PCP for future refills and management.    She will return to urgent care if needed.

## 2022-07-25 ENCOUNTER — OFFICE VISIT (OUTPATIENT)
Dept: MEDICAL GROUP | Facility: PHYSICIAN GROUP | Age: 42
End: 2022-07-25
Payer: COMMERCIAL

## 2022-07-25 VITALS
HEART RATE: 78 BPM | OXYGEN SATURATION: 98 % | SYSTOLIC BLOOD PRESSURE: 116 MMHG | WEIGHT: 158.2 LBS | HEIGHT: 68 IN | RESPIRATION RATE: 16 BRPM | BODY MASS INDEX: 23.98 KG/M2 | TEMPERATURE: 98.7 F | DIASTOLIC BLOOD PRESSURE: 60 MMHG

## 2022-07-25 DIAGNOSIS — Z12.31 ENCOUNTER FOR SCREENING MAMMOGRAM FOR BREAST CANCER: ICD-10-CM

## 2022-07-25 DIAGNOSIS — Z76.0 ENCOUNTER FOR MEDICATION REFILL: ICD-10-CM

## 2022-07-25 DIAGNOSIS — Z12.31 ENCOUNTER FOR SCREENING MAMMOGRAM FOR MALIGNANT NEOPLASM OF BREAST: ICD-10-CM

## 2022-07-25 DIAGNOSIS — Z00.00 WELL WOMAN EXAM (NO GYNECOLOGICAL EXAM): ICD-10-CM

## 2022-07-25 DIAGNOSIS — N95.1 MENOPAUSAL SYMPTOMS: ICD-10-CM

## 2022-07-25 PROCEDURE — 99396 PREV VISIT EST AGE 40-64: CPT | Performed by: FAMILY MEDICINE

## 2022-07-25 RX ORDER — ESTRADIOL 0.07 MG/D
1 PATCH TRANSDERMAL
Qty: 4 PATCH | Refills: 1 | Status: SHIPPED | OUTPATIENT
Start: 2022-07-25 | End: 2022-09-20

## 2022-07-25 ASSESSMENT — PATIENT HEALTH QUESTIONNAIRE - PHQ9: CLINICAL INTERPRETATION OF PHQ2 SCORE: 0

## 2022-07-25 NOTE — PROGRESS NOTES
Subjective:     CC:   Chief Complaint   Patient presents with   • Medication Refill       HPI:   Lorna Villa is a 42 y.o. female who presents for annual exam. She is feeling well and denies any complaints.    Ob-Gyn/ History:    Patient has GYN provider: yes  S/p total hysterectomy 7/21.   On estrogen topical therapy ever since.     Health Maintenance  Diet: eating healthy   Exercise: keeping active   Substance Abuse: none   Safe in relationship. yes  Seat belts, bike helmet, gun safety discussed.  Sun protection used.    Cancer screening  Colorectal Cancer Screening: not due      Infectious disease screening/Immunizations  --Immunizations:    UTD   She  has a past medical history of Anemia, Anesthesia, Arrhythmia, Irregular periods, and Urinary incontinence.    She has no past medical history of Addisons disease (HCC), Adrenal disorder (HCC), Allergy, Anxiety, Blood transfusion without reported diagnosis, Clotting disorder (HCC), COPD (chronic obstructive pulmonary disease) (HCC), Cushings syndrome (HCC), Depression, Diabetic neuropathy (HCC), GERD (gastroesophageal reflux disease), Goiter, Head ache, HIV (human immunodeficiency virus infection) (HCC), Hyperlipidemia, IBD (inflammatory bowel disease), Meningitis, Migraine, Muscle disorder, Osteoporosis, Parathyroid disorder (HCC), Pituitary disease (HCC), Sickle cell disease (HCC), Substance abuse (HCC), or Urinary tract infection.  She  has a past surgical history that includes primary c section (12/1997); repeat c section (03/2000); repeat c section (12/2002); pr cystourethroscopy (N/A, 7/16/2021); hysterectomy robotic xi (N/A, 7/16/2021); and salpingo oophorectomy (Bilateral, 7/16/2021).    Family History   Problem Relation Age of Onset   • Cancer Mother         ovarian   • Cancer Maternal Grandmother         ovarian       Social History     Socioeconomic History   • Marital status:      Spouse name: Not on file   • Number of children: Not on  "file   • Years of education: Not on file   • Highest education level: Not on file   Occupational History   • Not on file   Tobacco Use   • Smoking status: Former Smoker     Packs/day: 0.50     Quit date:      Years since quittin.5   • Smokeless tobacco: Never Used   Vaping Use   • Vaping Use: Never used   Substance and Sexual Activity   • Alcohol use: Yes     Alcohol/week: 1.2 oz     Types: 2 Glasses of wine per week     Comment: weekends   • Drug use: Never   • Sexual activity: Yes     Partners: Male   Other Topics Concern   • Not on file   Social History Narrative    ** Merged History Encounter **          Social Determinants of Health     Financial Resource Strain: Not on file   Food Insecurity: Not on file   Transportation Needs: Not on file   Physical Activity: Not on file   Stress: Not on file   Social Connections: Not on file   Intimate Partner Violence: Not on file   Housing Stability: Not on file       Patient Active Problem List    Diagnosis Date Noted   • Family history of ovarian cancer 2021   • Intramural, submucous, and subserous leiomyoma of uterus 2021   • Abnormal uterine bleeding (AUB) 2021   • Irregular periods 2020         Current Outpatient Medications   Medication Sig Dispense Refill   • estradiol (CLIMARA) 0.075 MG/24HR PATCH WEEKLY Place 1 Patch on the skin every 7 days. 4 Patch 1   • ibuprofen (MOTRIN) 800 MG Tab Take 1 tablet by mouth every 8 hours as needed. 30 tablet 0   • acetaminophen (TYLENOL) 500 MG Tab Take 2 Tablets by mouth every 6 hours as needed for Mild Pain. 30 tablet 0   • docusate sodium (COLACE) 100 MG Cap Take 1 capsule by mouth 2 times a day. 60 capsule 1     No current facility-administered medications for this visit.     Allergies   Allergen Reactions   • Demerol Hives   • Lexapro Anaphylaxis     \"go blind\" shock       Review of Systems   Constitutional: Negative for fever, chills and malaise/fatigue.   HENT: Negative for congestion.  " "  Eyes: Negative for pain.   Respiratory: Negative for cough and shortness of breath.    Cardiovascular: Negative for leg swelling.   Gastrointestinal: Negative for nausea, vomiting, abdominal pain and diarrhea.   Genitourinary: Negative for dysuria and hematuria.   Skin: Negative for rash.   Neurological: Negative for dizziness, focal weakness and headaches.   Endo/Heme/Allergies: Does not bruise/bleed easily.   Psychiatric/Behavioral: Negative for depression.  The patient is not nervous/anxious.      Objective:     /60 (BP Location: Left arm, Patient Position: Sitting, BP Cuff Size: Adult)   Pulse 78   Temp 37.1 °C (98.7 °F) (Temporal)   Resp 16   Ht 1.727 m (5' 8\")   Wt 71.8 kg (158 lb 3.2 oz)   SpO2 98%   BMI 24.05 kg/m²   Body mass index is 24.05 kg/m².  Wt Readings from Last 4 Encounters:   07/25/22 71.8 kg (158 lb 3.2 oz)   06/19/22 66.7 kg (147 lb)   08/02/21 66.7 kg (147 lb)   07/16/21 67.4 kg (148 lb 9.4 oz)       Physical Exam:  Constitutional: Well-developed and well-nourished. Not diaphoretic. No distress.   Skin: Skin is warm and dry. No rash noted.  Head: Atraumatic without lesions.  Eyes: Conjunctivae and extraocular motions are normal. Pupils are equal, round, and reactive to light. No scleral icterus.   Ears:  External ears unremarkable. Tympanic membranes clear and intact.  Nose: Nares patent. Septum midline. Turbinates without erythema nor edema. No discharge.   Mouth/Throat: Dentition is normal. Tongue normal. Oropharynx is clear and moist. Posterior pharynx without erythema or exudates.  Neck: Supple, trachea midline. Normal range of motion. No thyromegaly present. No lymphadenopathy--cervical or supraclavicular.  Cardiovascular: Regular rate and rhythm, S1 and S2 without murmur, rubs, or gallops.  Lungs: Normal inspiratory effort, CTA bilaterally, no wheezes/rhonchi/rales  Abdomen: Soft, non tender, and without distention. Active bowel sounds in all four quadrants. No rebound, " guarding, masses or HSM.  Extremities: No cyanosis, clubbing, erythema, nor edema. Distal pulses intact and symmetric.   Musculoskeletal: All major joints AROM full in all directions without pain.  Neurological: Alert and oriented x 3.   Psychiatric:  Behavior, mood, and affect are appropriate.    Assessment and Plan:     1. Well woman exam (no gynecological exam)  CBC WITHOUT DIFFERENTIAL    Comp Metabolic Panel    Lipid Profile    HEMOGLOBIN A1C    ESTROGEN TOTAL    PROGESTERONE    TESTOSTERONE F&T FEMALES/CHILD    LUTEINIZING HORMONE SERUM    FSH    Referral to Gynecology   2. Encounter for screening mammogram for breast cancer     3. Encounter for screening mammogram for malignant neoplasm of breast  MA-SCREENING MAMMO BILAT W/ IMPLANTS W/CAD   4. Encounter for medication refill  estradiol (CLIMARA) 0.075 MG/24HR PATCH WEEKLY   5. Menopausal symptoms  estradiol (CLIMARA) 0.075 MG/24HR PATCH WEEKLY       HCM:  Completed   Labs per orders  Patient counseled about skin care, diet, supplements, prenatal vitamins, safe sex and exercise.    Follow-up: Return in about 6 months (around 1/25/2023).

## 2022-08-15 ENCOUNTER — HOSPITAL ENCOUNTER (OUTPATIENT)
Dept: RADIOLOGY | Facility: MEDICAL CENTER | Age: 42
End: 2022-08-15
Attending: FAMILY MEDICINE
Payer: COMMERCIAL

## 2022-08-15 DIAGNOSIS — Z12.31 ENCOUNTER FOR SCREENING MAMMOGRAM FOR MALIGNANT NEOPLASM OF BREAST: ICD-10-CM

## 2022-08-15 PROCEDURE — 77063 BREAST TOMOSYNTHESIS BI: CPT

## 2022-08-19 ENCOUNTER — HOSPITAL ENCOUNTER (OUTPATIENT)
Dept: RADIOLOGY | Facility: MEDICAL CENTER | Age: 42
End: 2022-08-19
Attending: FAMILY MEDICINE
Payer: COMMERCIAL

## 2022-08-19 DIAGNOSIS — R92.8 ABNORMAL MAMMOGRAM: ICD-10-CM

## 2022-08-19 PROCEDURE — 76642 ULTRASOUND BREAST LIMITED: CPT | Mod: LT

## 2022-09-19 DIAGNOSIS — N95.1 MENOPAUSAL SYMPTOMS: ICD-10-CM

## 2022-09-19 DIAGNOSIS — Z76.0 ENCOUNTER FOR MEDICATION REFILL: ICD-10-CM

## 2022-09-20 ENCOUNTER — TELEPHONE (OUTPATIENT)
Dept: MEDICAL GROUP | Facility: PHYSICIAN GROUP | Age: 42
End: 2022-09-20
Payer: COMMERCIAL

## 2022-09-20 RX ORDER — ESTRADIOL 0.07 MG/D
PATCH TRANSDERMAL
Qty: 4 PATCH | Refills: 0 | Status: SHIPPED | OUTPATIENT
Start: 2022-09-20 | End: 2023-11-29

## 2022-09-20 NOTE — TELEPHONE ENCOUNTER
Already working on this, provider is ooo and other providers are covering as needed. I have already brought this patient to their attention

## 2022-09-23 ENCOUNTER — OFFICE VISIT (OUTPATIENT)
Dept: URGENT CARE | Facility: PHYSICIAN GROUP | Age: 42
End: 2022-09-23
Payer: COMMERCIAL

## 2022-09-23 VITALS
WEIGHT: 147 LBS | BODY MASS INDEX: 23.07 KG/M2 | SYSTOLIC BLOOD PRESSURE: 120 MMHG | HEART RATE: 86 BPM | OXYGEN SATURATION: 98 % | RESPIRATION RATE: 16 BRPM | TEMPERATURE: 98.6 F | DIASTOLIC BLOOD PRESSURE: 78 MMHG | HEIGHT: 67 IN

## 2022-09-23 DIAGNOSIS — R35.0 FREQUENT URINATION: ICD-10-CM

## 2022-09-23 DIAGNOSIS — H92.02 OTALGIA, LEFT: ICD-10-CM

## 2022-09-23 LAB
APPEARANCE UR: CLEAR
BILIRUB UR STRIP-MCNC: NORMAL MG/DL
COLOR UR AUTO: YELLOW
GLUCOSE UR STRIP.AUTO-MCNC: NORMAL MG/DL
KETONES UR STRIP.AUTO-MCNC: NORMAL MG/DL
LEUKOCYTE ESTERASE UR QL STRIP.AUTO: NORMAL
NITRITE UR QL STRIP.AUTO: NORMAL
PH UR STRIP.AUTO: 5.5 [PH] (ref 5–8)
PROT UR QL STRIP: NORMAL MG/DL
RBC UR QL AUTO: NORMAL
SP GR UR STRIP.AUTO: 1.01
UROBILINOGEN UR STRIP-MCNC: 0.2 MG/DL

## 2022-09-23 PROCEDURE — 81002 URINALYSIS NONAUTO W/O SCOPE: CPT | Performed by: STUDENT IN AN ORGANIZED HEALTH CARE EDUCATION/TRAINING PROGRAM

## 2022-09-23 PROCEDURE — 99213 OFFICE O/P EST LOW 20 MIN: CPT | Performed by: STUDENT IN AN ORGANIZED HEALTH CARE EDUCATION/TRAINING PROGRAM

## 2022-09-23 RX ORDER — CEFDINIR 300 MG/1
300 CAPSULE ORAL 2 TIMES DAILY
Qty: 10 CAPSULE | Refills: 0 | Status: SHIPPED | OUTPATIENT
Start: 2022-09-23 | End: 2022-09-28

## 2022-09-23 NOTE — PROGRESS NOTES
"Subjective:   CHIEF COMPLAINT  Chief Complaint   Patient presents with    Otalgia     Left ear has stabbing 2 days     Urinary Frequency     X 2 days        HPI  Lorna Villa is a 42 y.o. female who presents burning with urination and frequency x2 days.  Reports feeling suprapubic pressure.  She is not tried take any medication for symptomatic relief.  She is not on AZO.  No vaginal discharge or pruritus.  No concerns for STIs.  No hematuria.  No fevers or chills.      Patient also presents with a chief complaint of left ear pain.  Describes as a shooting pain that started approximately 2 days ago.  She has tried taking Tylenol and ibuprofen which help but only provide transient relief of symptoms.  Reports she has had ear infections as an adult.  No systemic symptoms.  No sore throat or sinus congestion.  No cough.      REVIEW OF SYSTEMS  General: no fever or chills  GI: no nausea or vomiting  See HPI for further details.    PAST MEDICAL HISTORY  Patient Active Problem List    Diagnosis Date Noted    Family history of ovarian cancer 07/16/2021    Intramural, submucous, and subserous leiomyoma of uterus 06/22/2021    Abnormal uterine bleeding (AUB) 06/22/2021    Irregular periods 02/25/2020       SURGICAL HISTORY   has a past surgical history that includes primary c section (12/1997); repeat c section (03/2000); repeat c section (12/2002); cystourethroscopy (N/A, 7/16/2021); hysterectomy robotic xi (N/A, 7/16/2021); and salpingo oophorectomy (Bilateral, 7/16/2021).    ALLERGIES  Allergies   Allergen Reactions    Demerol Hives    Lexapro Anaphylaxis     \"go blind\" shock       CURRENT MEDICATIONS  Home Medications       Reviewed by Lonnie Arteaga Ass't (Medical Assistant) on 09/23/22 at 1039  Med List Status: <None>     Medication Last Dose Status   acetaminophen (TYLENOL) 500 MG Tab Taking Active   docusate sodium (COLACE) 100 MG Cap Taking Active   estradiol (CLIMARA) 0.075 MG/24HR PATCH WEEKLY " "Taking Active   ibuprofen (MOTRIN) 800 MG Tab Taking Active                    SOCIAL HISTORY  Social History     Tobacco Use    Smoking status: Former     Packs/day: 0.50     Types: Cigarettes     Quit date: 2018     Years since quittin.7    Smokeless tobacco: Never   Vaping Use    Vaping Use: Never used   Substance and Sexual Activity    Alcohol use: Yes     Alcohol/week: 1.2 oz     Types: 2 Glasses of wine per week     Comment: weekends    Drug use: Never    Sexual activity: Yes     Partners: Male       FAMILY HISTORY  Family History   Problem Relation Age of Onset    Cancer Mother         ovarian    Cancer Maternal Grandmother         ovarian          Objective:   PHYSICAL EXAM  VITAL SIGNS: /78   Pulse 86   Temp 37 °C (98.6 °F) (Temporal)   Resp 16   Ht 1.689 m (5' 6.5\")   Wt 66.7 kg (147 lb)   LMP 06/10/2021   SpO2 98%   BMI 23.37 kg/m²     Gen: no acute distress, normal voice  Skin: dry, intact, moist mucosal membranes  Lungs: CTAB w/ symmetric expansion  CV: RRR w/o murmurs or clicks  ENTU: Left TM dull with abnormal light reflex.  Minimal erythema and intact.  Right TM clear and intact without bulging erythema or effusion.  Psych: normal affect, normal judgement, alert, awake    UA: Trace blood.  No nitrates or leukocytes.    Assessment/Plan:     1. Frequent urination  POCT Urinalysis    cefdinir (OMNICEF) 300 MG Cap      2. Otalgia, left  cefdinir (OMNICEF) 300 MG Cap      Examination suggestive of possible early development of left otitis media.  Patient has had otitis media as an adult.    UA was unremarkable and doubt she has an underlying acute cystitis.  No systemic symptoms or gross hematuria.   - Prescription for cefdinir sent to pharmacy for empiric treatment of otitis media.  Fortunately this will also cover an acute cystitis.  - 2 L H2O daily  - Continue ibuprofen and Tylenol as needed  - Return to urgent care any new/worsening symptoms or further questions or concerns.  Patient " understood everything discussed.  All questions were answered.      Differential diagnosis, natural history, supportive care, and indications for immediate follow-up discussed. All questions answered. Patient agrees with the plan of care.    Follow-up as needed if symptoms worsen or fail to improve to PCP, Urgent care or Emergency Room.    Please note that this dictation was created using voice recognition software. I have made a reasonable attempt to correct obvious errors, but I expect that there are errors of grammar and possibly content that I did not discover before finalizing the note.

## 2022-09-27 ENCOUNTER — OFFICE VISIT (OUTPATIENT)
Dept: URGENT CARE | Facility: PHYSICIAN GROUP | Age: 42
End: 2022-09-27

## 2022-09-27 ENCOUNTER — HOSPITAL ENCOUNTER (OUTPATIENT)
Facility: MEDICAL CENTER | Age: 42
End: 2022-09-27
Attending: PHYSICIAN ASSISTANT
Payer: COMMERCIAL

## 2022-09-27 VITALS
BODY MASS INDEX: 25.15 KG/M2 | SYSTOLIC BLOOD PRESSURE: 110 MMHG | HEIGHT: 67 IN | WEIGHT: 160.2 LBS | DIASTOLIC BLOOD PRESSURE: 62 MMHG | HEART RATE: 72 BPM | TEMPERATURE: 97.6 F | OXYGEN SATURATION: 96 % | RESPIRATION RATE: 16 BRPM

## 2022-09-27 DIAGNOSIS — Z02.1 ENCOUNTER FOR PRE-EMPLOYMENT EXAMINATION: ICD-10-CM

## 2022-09-27 PROCEDURE — 8915 PR COMPREHENSIVE PHYSICAL: Performed by: PHYSICIAN ASSISTANT

## 2022-09-27 NOTE — PROGRESS NOTES
Chief Complaint   Patient presents with    Employment Physical     HPI:  Patient here today for employment physical.      Physical Exam:  Unremarkable. Forms were completed and scanned into patient's chart.      Assessment/plan:  Patient is cleared for full duty without restrictions. Forms were completed and scanned into patient's chart. See scanned forms under media tab.

## 2022-09-29 LAB
GAMMA INTERFERON BACKGROUND BLD IA-ACNC: 0.03 IU/ML
M TB IFN-G BLD-IMP: NEGATIVE
M TB IFN-G CD4+ BCKGRND COR BLD-ACNC: 0.01 IU/ML
MITOGEN IGNF BCKGRD COR BLD-ACNC: >10 IU/ML
QFT TB2 - NIL TBQ2: 0.04 IU/ML

## 2022-10-11 ENCOUNTER — HOSPITAL ENCOUNTER (OUTPATIENT)
Dept: LAB | Facility: MEDICAL CENTER | Age: 42
End: 2022-10-11
Attending: FAMILY MEDICINE
Payer: COMMERCIAL

## 2022-10-11 DIAGNOSIS — Z00.00 WELL WOMAN EXAM (NO GYNECOLOGICAL EXAM): ICD-10-CM

## 2022-10-11 LAB
ALBUMIN SERPL BCP-MCNC: 4.2 G/DL (ref 3.2–4.9)
ALBUMIN/GLOB SERPL: 1.2 G/DL
ALP SERPL-CCNC: 98 U/L (ref 30–99)
ALT SERPL-CCNC: 17 U/L (ref 2–50)
ANION GAP SERPL CALC-SCNC: 10 MMOL/L (ref 7–16)
AST SERPL-CCNC: 18 U/L (ref 12–45)
BILIRUB SERPL-MCNC: 0.2 MG/DL (ref 0.1–1.5)
BUN SERPL-MCNC: 15 MG/DL (ref 8–22)
CALCIUM SERPL-MCNC: 9.5 MG/DL (ref 8.5–10.5)
CHLORIDE SERPL-SCNC: 104 MMOL/L (ref 96–112)
CHOLEST SERPL-MCNC: 148 MG/DL (ref 100–199)
CO2 SERPL-SCNC: 26 MMOL/L (ref 20–33)
CREAT SERPL-MCNC: 0.76 MG/DL (ref 0.5–1.4)
ERYTHROCYTE [DISTWIDTH] IN BLOOD BY AUTOMATED COUNT: 40.4 FL (ref 35.9–50)
EST. AVERAGE GLUCOSE BLD GHB EST-MCNC: 108 MG/DL
FASTING STATUS PATIENT QL REPORTED: NORMAL
FSH SERPL-ACNC: 31.5 MIU/ML
GFR SERPLBLD CREATININE-BSD FMLA CKD-EPI: 101 ML/MIN/1.73 M 2
GLOBULIN SER CALC-MCNC: 3.4 G/DL (ref 1.9–3.5)
GLUCOSE SERPL-MCNC: 95 MG/DL (ref 65–99)
HBA1C MFR BLD: 5.4 % (ref 4–5.6)
HCT VFR BLD AUTO: 44.5 % (ref 37–47)
HDLC SERPL-MCNC: 47 MG/DL
HGB BLD-MCNC: 15.5 G/DL (ref 12–16)
LDLC SERPL CALC-MCNC: 79 MG/DL
LH SERPL-ACNC: 22.7 IU/L
MCH RBC QN AUTO: 32 PG (ref 27–33)
MCHC RBC AUTO-ENTMCNC: 34.8 G/DL (ref 33.6–35)
MCV RBC AUTO: 91.8 FL (ref 81.4–97.8)
PLATELET # BLD AUTO: 207 K/UL (ref 164–446)
PMV BLD AUTO: 11.7 FL (ref 9–12.9)
POTASSIUM SERPL-SCNC: 3.8 MMOL/L (ref 3.6–5.5)
PROGEST SERPL-MCNC: 0.23 NG/ML
PROT SERPL-MCNC: 7.6 G/DL (ref 6–8.2)
RBC # BLD AUTO: 4.85 M/UL (ref 4.2–5.4)
SODIUM SERPL-SCNC: 140 MMOL/L (ref 135–145)
TRIGL SERPL-MCNC: 110 MG/DL (ref 0–149)
WBC # BLD AUTO: 5.9 K/UL (ref 4.8–10.8)

## 2022-10-11 PROCEDURE — 82671 ASSAY OF ESTROGENS: CPT

## 2022-10-11 PROCEDURE — 83002 ASSAY OF GONADOTROPIN (LH): CPT

## 2022-10-11 PROCEDURE — 84270 ASSAY OF SEX HORMONE GLOBUL: CPT

## 2022-10-11 PROCEDURE — 36415 COLL VENOUS BLD VENIPUNCTURE: CPT

## 2022-10-11 PROCEDURE — 84402 ASSAY OF FREE TESTOSTERONE: CPT

## 2022-10-11 PROCEDURE — 80053 COMPREHEN METABOLIC PANEL: CPT

## 2022-10-11 PROCEDURE — 83001 ASSAY OF GONADOTROPIN (FSH): CPT

## 2022-10-11 PROCEDURE — 84144 ASSAY OF PROGESTERONE: CPT

## 2022-10-11 PROCEDURE — 80061 LIPID PANEL: CPT

## 2022-10-11 PROCEDURE — 85027 COMPLETE CBC AUTOMATED: CPT

## 2022-10-11 PROCEDURE — 83036 HEMOGLOBIN GLYCOSYLATED A1C: CPT

## 2022-10-11 PROCEDURE — 84403 ASSAY OF TOTAL TESTOSTERONE: CPT

## 2022-10-15 LAB
ESTRADIOL SERPL HS-MCNC: 26.4 PG/ML
ESTROGEN SERPL CALC-MCNC: 51.1 PG/ML
ESTRONE SERPL-MCNC: 24.7 PG/ML

## 2022-10-18 LAB
SHBG SERPL-SCNC: 43 NMOL/L (ref 25–122)
TESTOST FREE SERPL-MCNC: 1.4 PG/ML (ref 1.1–5.8)
TESTOST SERPL-MCNC: 10 NG/DL (ref 9–55)

## 2022-11-03 ENCOUNTER — HOSPITAL ENCOUNTER (OUTPATIENT)
Facility: MEDICAL CENTER | Age: 42
End: 2022-11-03
Attending: NURSE PRACTITIONER
Payer: COMMERCIAL

## 2022-11-03 ENCOUNTER — OFFICE VISIT (OUTPATIENT)
Dept: URGENT CARE | Facility: PHYSICIAN GROUP | Age: 42
End: 2022-11-03
Payer: COMMERCIAL

## 2022-11-03 VITALS
WEIGHT: 163 LBS | HEART RATE: 75 BPM | OXYGEN SATURATION: 99 % | SYSTOLIC BLOOD PRESSURE: 104 MMHG | RESPIRATION RATE: 16 BRPM | HEIGHT: 67 IN | BODY MASS INDEX: 25.58 KG/M2 | TEMPERATURE: 97.4 F | DIASTOLIC BLOOD PRESSURE: 70 MMHG

## 2022-11-03 DIAGNOSIS — R39.9 LOWER URINARY TRACT SYMPTOMS (LUTS): ICD-10-CM

## 2022-11-03 LAB
APPEARANCE UR: CLEAR
BILIRUB UR STRIP-MCNC: NEGATIVE MG/DL
COLOR UR AUTO: YELLOW
GLUCOSE UR STRIP.AUTO-MCNC: NEGATIVE MG/DL
KETONES UR STRIP.AUTO-MCNC: NEGATIVE MG/DL
LEUKOCYTE ESTERASE UR QL STRIP.AUTO: NEGATIVE
NITRITE UR QL STRIP.AUTO: NEGATIVE
PH UR STRIP.AUTO: 6.5 [PH] (ref 5–8)
PROT UR QL STRIP: NEGATIVE MG/DL
RBC UR QL AUTO: NORMAL
SP GR UR STRIP.AUTO: 1.01
UROBILINOGEN UR STRIP-MCNC: 0.2 MG/DL

## 2022-11-03 PROCEDURE — 87086 URINE CULTURE/COLONY COUNT: CPT

## 2022-11-03 PROCEDURE — 81002 URINALYSIS NONAUTO W/O SCOPE: CPT | Performed by: NURSE PRACTITIONER

## 2022-11-03 PROCEDURE — 99214 OFFICE O/P EST MOD 30 MIN: CPT | Performed by: NURSE PRACTITIONER

## 2022-11-03 RX ORDER — CEFDINIR 300 MG/1
300 CAPSULE ORAL 2 TIMES DAILY
Qty: 10 CAPSULE | Refills: 0 | Status: SHIPPED | OUTPATIENT
Start: 2022-11-03 | End: 2022-11-08

## 2022-11-03 RX ORDER — PHENAZOPYRIDINE HYDROCHLORIDE 200 MG/1
200 TABLET, FILM COATED ORAL 3 TIMES DAILY PRN
Qty: 6 TABLET | Refills: 0 | Status: SHIPPED | OUTPATIENT
Start: 2022-11-03 | End: 2022-12-16

## 2022-11-03 ASSESSMENT — ENCOUNTER SYMPTOMS
BACK PAIN: 0
DIZZINESS: 0
FLANK PAIN: 0
NAUSEA: 0
DIARRHEA: 0
HEADACHES: 0
FEVER: 0
VOMITING: 0
ORTHOPNEA: 0
CHILLS: 0
ABDOMINAL PAIN: 1

## 2022-11-03 ASSESSMENT — PAIN SCALES - GENERAL: PAINLEVEL: 8=MODERATE-SEVERE PAIN

## 2022-11-03 ASSESSMENT — FIBROSIS 4 INDEX: FIB4 SCORE: 0.86

## 2022-11-03 NOTE — PROGRESS NOTES
Subjective     Lorna Villa is a 42 y.o. female who presents with UTI (Fatigue, pain in bladder, x2-3 days. )            HPI  New problem.  Patient is a 42-year-old female who presents with lower abdominal pain and fatigue x2 to 3 days.  She denies frequency, urgency, back pain, blood in her urine, nausea, or fever or chills.  She reports that she has burning with urination that feels like it is inside the ureter.  She denies any vaginal discharge that is unusual.  She has been taking cranberry pills for her symptoms.    Demerol and Lexapro  Current Outpatient Medications on File Prior to Visit   Medication Sig Dispense Refill    estradiol (CLIMARA) 0.075 MG/24HR PATCH WEEKLY APPLY 1 PATCH TOPICALLY TO THE SKIN EVERY 7 DAYS 4 Patch 0     No current facility-administered medications on file prior to visit.     Social History     Socioeconomic History    Marital status:      Spouse name: Not on file    Number of children: Not on file    Years of education: Not on file    Highest education level: Not on file   Occupational History    Not on file   Tobacco Use    Smoking status: Former     Packs/day: 0.50     Types: Cigarettes     Quit date:      Years since quittin.8    Smokeless tobacco: Never   Vaping Use    Vaping Use: Never used   Substance and Sexual Activity    Alcohol use: Yes     Alcohol/week: 1.2 oz     Types: 2 Glasses of wine per week     Comment: weekends    Drug use: Never    Sexual activity: Yes     Partners: Male   Other Topics Concern    Not on file   Social History Narrative    ** Merged History Encounter **          Social Determinants of Health     Financial Resource Strain: Not on file   Food Insecurity: Not on file   Transportation Needs: Not on file   Physical Activity: Not on file   Stress: Not on file   Social Connections: Not on file   Intimate Partner Violence: Not on file   Housing Stability: Not on file     Breast Cancer-related family history is not on  "file.      Review of Systems   Constitutional:  Negative for chills and fever.   Cardiovascular:  Negative for chest pain and orthopnea.   Gastrointestinal:  Positive for abdominal pain. Negative for diarrhea, nausea and vomiting.   Genitourinary:  Positive for dysuria. Negative for flank pain, frequency, hematuria and urgency.   Musculoskeletal:  Negative for back pain.   Neurological:  Negative for dizziness and headaches.            Objective     /70   Pulse 75   Temp 36.3 °C (97.4 °F) (Temporal)   Resp 16   Ht 1.702 m (5' 7\")   Wt 73.9 kg (163 lb)   LMP 06/10/2021   SpO2 99%   BMI 25.53 kg/m²      Physical Exam  Vitals reviewed.   Constitutional:       General: She is not in acute distress.     Appearance: She is well-developed.   Cardiovascular:      Rate and Rhythm: Normal rate and regular rhythm.      Heart sounds: Normal heart sounds. No murmur heard.  Pulmonary:      Effort: Pulmonary effort is normal. No respiratory distress.      Breath sounds: Normal breath sounds.   Abdominal:      General: Bowel sounds are normal.      Palpations: Abdomen is soft.      Tenderness: There is abdominal tenderness in the suprapubic area. There is no right CVA tenderness or left CVA tenderness.   Musculoskeletal:         General: Normal range of motion.      Comments: Moves all 4 extremities normally   Skin:     General: Skin is warm and dry.   Neurological:      Mental Status: She is alert and oriented to person, place, and time.   Psychiatric:         Behavior: Behavior normal.         Thought Content: Thought content normal.                           Assessment & Plan        1. Lower urinary tract symptoms (LUTS)  POCT Urinalysis    phenazopyridine (PYRIDIUM) 200 MG Tab    cefdinir (OMNICEF) 300 MG Cap    URINE CULTURE(NEW)    CANCELED: CULTURE THROAT        U/a negative.  Culture.  Cefdinir as she is symptomatic  Differential diagnosis, natural history, supportive care, and indications for immediate " follow-up discussed at length.

## 2022-11-05 LAB
BACTERIA UR CULT: NORMAL
SIGNIFICANT IND 70042: NORMAL
SITE SITE: NORMAL
SOURCE SOURCE: NORMAL

## 2022-12-16 ENCOUNTER — OFFICE VISIT (OUTPATIENT)
Dept: URGENT CARE | Facility: PHYSICIAN GROUP | Age: 42
End: 2022-12-16
Payer: COMMERCIAL

## 2022-12-16 VITALS
HEART RATE: 104 BPM | TEMPERATURE: 98.8 F | HEIGHT: 67 IN | SYSTOLIC BLOOD PRESSURE: 120 MMHG | OXYGEN SATURATION: 99 % | BODY MASS INDEX: 25.58 KG/M2 | WEIGHT: 163 LBS | RESPIRATION RATE: 20 BRPM | DIASTOLIC BLOOD PRESSURE: 60 MMHG

## 2022-12-16 DIAGNOSIS — U07.1 COVID-19: ICD-10-CM

## 2022-12-16 PROCEDURE — 99213 OFFICE O/P EST LOW 20 MIN: CPT | Performed by: NURSE PRACTITIONER

## 2022-12-16 ASSESSMENT — FIBROSIS 4 INDEX: FIB4 SCORE: 0.86

## 2022-12-16 NOTE — PROGRESS NOTES
Chief Complaint   Patient presents with    Pharyngitis    Headache    Body Aches    Fever     102.1. tested POS for Covid this morning.         HISTORY OF PRESENT ILLNESS: Patient is a pleasant 42 y.o. female who presents today due to symptoms which started two days ago. Pt reports a dry cough, rhinitis, sore throat, nausea, fever, chills, fatigue, malaise, and body aches. Denies chest pain, shortness of breath, or wheezing. Denies h/o asthma/copd/CAP. No immunocompromise. Has tried OTC cold medications without significant relief of symptoms. No recent ABX use. No other aggravating or alleviating factors.  She took 2 home COVID test this morning, both are positive.  She is a registered nurse.      Patient Active Problem List    Diagnosis Date Noted    Family history of ovarian cancer 2021    Intramural, submucous, and subserous leiomyoma of uterus 2021    Abnormal uterine bleeding (AUB) 2021    Irregular periods 2020       Allergies:Demerol and Lexapro    Current Outpatient Medications Ordered in Epic   Medication Sig Dispense Refill    estradiol (CLIMARA) 0.075 MG/24HR PATCH WEEKLY APPLY 1 PATCH TOPICALLY TO THE SKIN EVERY 7 DAYS (Patient not taking: Reported on 2022) 4 Patch 0     No current Epic-ordered facility-administered medications on file.       Past Medical History:   Diagnosis Date    Anemia     Anesthesia     Severe shakes    Arrhythmia     Feels intermittent rapid heart rate    Irregular periods     Urinary incontinence     stress incontinence       Social History     Tobacco Use    Smoking status: Former     Packs/day: 0.50     Types: Cigarettes     Quit date: 2018     Years since quittin.9    Smokeless tobacco: Never   Vaping Use    Vaping Use: Never used   Substance Use Topics    Alcohol use: Yes     Alcohol/week: 1.2 oz     Types: 2 Glasses of wine per week     Comment: weekends    Drug use: Never       Family Status   Relation Name Status    Mo  (Not Specified)     "MGMo  (Not Specified)     Family History   Problem Relation Age of Onset    Cancer Mother         ovarian    Cancer Maternal Grandmother         ovarian       ROS:  Review of Systems   Constitutional: Positive for subjective fever, chills, fatigue, malaise. Negative for weight loss.  HENT: Positive for rhinitis, sore throat. Negative for ear pain, nosebleeds, and neck pain.    Eyes: Negative for vision changes.   Cardiovascular: Negative for chest pain, palpitations, orthopnea and leg swelling.   Respiratory: Positive for cough without sputum production. Negative for shortness of breath and wheezing.   Gastrointestinal: Negative for abdominal pain, vomiting or diarrhea.   Skin: Negative for rash, diaphoresis.     Exam:  /60   Pulse (!) 104   Temp 37.1 °C (98.8 °F) (Temporal)   Resp 20   Ht 1.702 m (5' 7\")   Wt 73.9 kg (163 lb)   SpO2 99%   General: well-nourished, well-developed female in NAD  Head: normocephalic, atraumatic  Eyes: PERRLA, EOM within normal limits, no conjunctival injection, no scleral icterus, visual fields and acuity grossly intact.  Ears: normal shape and symmetry, no tenderness, no discharge. External canals are without any significant edema or erythema. Tympanic membranes are without any inflammation, no effusion. Gross auditory acuity is intact.  Nose: symmetrical without tenderness, mild discharge, erythema present bilateral nares.  Mouth/Throat: reasonable hygiene, no exudates or tonsillar enlargement. Mild erythema present.   Neck: no masses, range of motion within normal limits, no tracheal deviation.  Lymph: mild cervical adenopathy. No supraclavicular adenopathy.   Neuro: alert and oriented. Cranial nerves 1-12 grossly intact.   Cardiovascular: regular rate auscultated at 90, regular rhythm without murmurs, rubs, or gallops. No edema.   Pulmonary: no distress. Chest is symmetrical with respiration. No wheezes, crackles, or rhonchi.   Musculoskeletal: appropriate muscle tone, " gait is stable.  Skin: warm, dry, intact, no clubbing, no cyanosis.   Psych: appropriate mood, affect, judgement.         Assessment/Plan:  1. COVID-19                Discussed symptoms viral. Home quarantine advised. Discussed natural progression and sx care. Work note provided.   Rest, increase fluids, hand and respiratory hygiene. May take OTC medications as directed for symptom relief. STRICT ER precautions.   Supportive care, differential diagnoses, and indications for immediate follow-up discussed with patient.   Pathogenesis of diagnosis discussed including typical length and natural progression.  Instructed to return to clinic or nearest emergency department for any change in condition, further concerns, or worsening of symptoms.  Patient states understanding of the plan of care and discharge instructions.          JUSTIN Arevalo.

## 2022-12-16 NOTE — LETTER
December 16, 2022         Patient: Lorna Villa   YOB: 1980   Date of Visit: 12/16/2022       To Whom it May Concern,    Your employee was seen in our clinic today, diagnosed with COVID-19.    We are asking you to excuse absences while following self-isolation protocol per Center for Disease Control (CDC) guidelines.      Your employee should follow CDC guidelines for isolation and quarantine.      In general, repeat testing is not necessary and not offered through our [facility].     This is the only note that will be provided from [facility] for this visit.  Your employee will require an appointment with a primary care provider if FMLA or disability forms are required.      Sincerely,       JUSTIN Arevalo.  Electronically Signed

## 2023-02-09 ENCOUNTER — OFFICE VISIT (OUTPATIENT)
Dept: MEDICAL GROUP | Facility: PHYSICIAN GROUP | Age: 43
End: 2023-02-09
Payer: COMMERCIAL

## 2023-02-09 VITALS
SYSTOLIC BLOOD PRESSURE: 110 MMHG | BODY MASS INDEX: 25.99 KG/M2 | DIASTOLIC BLOOD PRESSURE: 70 MMHG | HEIGHT: 67 IN | WEIGHT: 165.6 LBS

## 2023-02-09 DIAGNOSIS — S76.311A STRAIN OF RIGHT HAMSTRING MUSCLE, INITIAL ENCOUNTER: ICD-10-CM

## 2023-02-09 DIAGNOSIS — R63.5 WEIGHT GAIN: ICD-10-CM

## 2023-02-09 DIAGNOSIS — Z00.00 WELL WOMAN EXAM (NO GYNECOLOGICAL EXAM): ICD-10-CM

## 2023-02-09 PROCEDURE — 99214 OFFICE O/P EST MOD 30 MIN: CPT | Performed by: FAMILY MEDICINE

## 2023-02-09 ASSESSMENT — FIBROSIS 4 INDEX: FIB4 SCORE: 0.86

## 2023-02-10 NOTE — ASSESSMENT & PLAN NOTE
New problem  Noticing some right hamstring pain  Some mild tingling     Not taking any medicine  No low back pain  Pain just came out of nowhere

## 2023-02-10 NOTE — PROGRESS NOTES
"Subjective:     No chief complaint on file.      HPI:   Lorna presents today to discuss the following.    Strain of right hamstring muscle  New problem  Noticing some right hamstring pain  Some mild tingling     Not taking any medicine  No low back pain  Pain just came out of nowhere     Weight gain  New issue  Weight gain noted lately  Has not been as active lately     Past Medical History:   Diagnosis Date    Anemia     Anesthesia     Severe shakes    Arrhythmia     Feels intermittent rapid heart rate    Irregular periods     Strain of right hamstring muscle 2/9/2023    Urinary incontinence     stress incontinence       Current Outpatient Medications Ordered in Epic   Medication Sig Dispense Refill    estradiol (CLIMARA) 0.075 MG/24HR PATCH WEEKLY APPLY 1 PATCH TOPICALLY TO THE SKIN EVERY 7 DAYS 4 Patch 0     No current Epic-ordered facility-administered medications on file.       Allergies:  Demerol and Lexapro    Health Maintenance: Completed    ROS:  Gen: no fevers/chills, no changes in weight  Eyes: no changes in vision  Pulm: no sob, no cough  CV: no chest pain, no palpitations  GI: no nausea/vomiting, no diarrhea      Objective:     Exam:  /70   Ht 1.702 m (5' 7\")   Wt 75.1 kg (165 lb 9.6 oz)   LMP 06/10/2021   BMI 25.94 kg/m²  Body mass index is 25.94 kg/m².    Gen: Alert and oriented, No apparent distress.  HENT: TMs are clear. Oropharynx is w/o erythema or exudates. Neck is supple without cervical lymphadenopathy. No JVD.   Eyes: PERRLA  Lungs: Normal effort, CTA bilaterally, no wheezes, rhonchi, or rales  CV: Regular rate and rhythm. No murmurs, rubs, or gallops.  Ext: No clubbing, cyanosis, edema.  Skin: no rash, lesions or ulcers  Neuro: Moves all extremities.  Psych: AAOx3        Assessment & Plan:     42 y.o. female with the following -     1. Strain of right hamstring muscle, initial encounter  New problem.  Likely a strain to the right hamstring area.  Offered a trial of muscle relaxer but " she would like to hold off.  Make start topical ointments and NSAIDs as needed.    2. Weight gain  New problem.  Unknown etiology and prognosis.  Could be secondary to hormonal therapy.  However I would like to order blood work to further evaluate.  Continue with lifestyle change.  - CBC WITHOUT DIFFERENTIAL; Future  - Comp Metabolic Panel; Future  - HEMOGLOBIN A1C; Future  - Lipid Profile; Future  - TSH WITH REFLEX TO FT4; Future    3. Well woman exam (no gynecological exam)  - CBC WITHOUT DIFFERENTIAL; Future  - Comp Metabolic Panel; Future  - HEMOGLOBIN A1C; Future  - Lipid Profile; Future  - TSH WITH REFLEX TO FT4; Future      Return in about 6 months (around 8/9/2023).          Please note that this dictation was created using voice recognition software. I have made every reasonable attempt to correct obvious errors, but I expect that there are errors of grammar and possibly content that I did not discover before finalizing the note.

## 2023-05-05 ENCOUNTER — HOSPITAL ENCOUNTER (OUTPATIENT)
Dept: LAB | Facility: MEDICAL CENTER | Age: 43
End: 2023-05-05
Attending: FAMILY MEDICINE
Payer: COMMERCIAL

## 2023-05-05 DIAGNOSIS — Z00.00 WELL WOMAN EXAM (NO GYNECOLOGICAL EXAM): ICD-10-CM

## 2023-05-05 DIAGNOSIS — R63.5 WEIGHT GAIN: ICD-10-CM

## 2023-05-05 LAB
ALBUMIN SERPL BCP-MCNC: 4.5 G/DL (ref 3.2–4.9)
ALBUMIN/GLOB SERPL: 1.7 G/DL
ALP SERPL-CCNC: 79 U/L (ref 30–99)
ALT SERPL-CCNC: 16 U/L (ref 2–50)
ANION GAP SERPL CALC-SCNC: 12 MMOL/L (ref 7–16)
AST SERPL-CCNC: 21 U/L (ref 12–45)
BILIRUB SERPL-MCNC: 0.4 MG/DL (ref 0.1–1.5)
BUN SERPL-MCNC: 14 MG/DL (ref 8–22)
CALCIUM ALBUM COR SERPL-MCNC: 9.1 MG/DL (ref 8.5–10.5)
CALCIUM SERPL-MCNC: 9.5 MG/DL (ref 8.5–10.5)
CHLORIDE SERPL-SCNC: 107 MMOL/L (ref 96–112)
CHOLEST SERPL-MCNC: 153 MG/DL (ref 100–199)
CO2 SERPL-SCNC: 23 MMOL/L (ref 20–33)
CREAT SERPL-MCNC: 0.81 MG/DL (ref 0.5–1.4)
ERYTHROCYTE [DISTWIDTH] IN BLOOD BY AUTOMATED COUNT: 40.4 FL (ref 35.9–50)
EST. AVERAGE GLUCOSE BLD GHB EST-MCNC: 100 MG/DL
FASTING STATUS PATIENT QL REPORTED: NORMAL
GFR SERPLBLD CREATININE-BSD FMLA CKD-EPI: 93 ML/MIN/1.73 M 2
GLOBULIN SER CALC-MCNC: 2.7 G/DL (ref 1.9–3.5)
GLUCOSE SERPL-MCNC: 91 MG/DL (ref 65–99)
HBA1C MFR BLD: 5.1 % (ref 4–5.6)
HCT VFR BLD AUTO: 44.7 % (ref 37–47)
HDLC SERPL-MCNC: 43 MG/DL
HGB BLD-MCNC: 15.6 G/DL (ref 12–16)
LDLC SERPL CALC-MCNC: 95 MG/DL
MCH RBC QN AUTO: 32.2 PG (ref 27–33)
MCHC RBC AUTO-ENTMCNC: 34.9 G/DL (ref 33.6–35)
MCV RBC AUTO: 92.2 FL (ref 81.4–97.8)
PLATELET # BLD AUTO: 195 K/UL (ref 164–446)
PMV BLD AUTO: 11.5 FL (ref 9–12.9)
POTASSIUM SERPL-SCNC: 4.1 MMOL/L (ref 3.6–5.5)
PROT SERPL-MCNC: 7.2 G/DL (ref 6–8.2)
RBC # BLD AUTO: 4.85 M/UL (ref 4.2–5.4)
SODIUM SERPL-SCNC: 142 MMOL/L (ref 135–145)
TRIGL SERPL-MCNC: 74 MG/DL (ref 0–149)
TSH SERPL DL<=0.005 MIU/L-ACNC: 2.29 UIU/ML (ref 0.38–5.33)
WBC # BLD AUTO: 4.9 K/UL (ref 4.8–10.8)

## 2023-05-05 PROCEDURE — 84443 ASSAY THYROID STIM HORMONE: CPT

## 2023-05-05 PROCEDURE — 83036 HEMOGLOBIN GLYCOSYLATED A1C: CPT

## 2023-05-05 PROCEDURE — 80053 COMPREHEN METABOLIC PANEL: CPT

## 2023-05-05 PROCEDURE — 36415 COLL VENOUS BLD VENIPUNCTURE: CPT

## 2023-05-05 PROCEDURE — 80061 LIPID PANEL: CPT

## 2023-05-05 PROCEDURE — 85027 COMPLETE CBC AUTOMATED: CPT

## 2023-07-18 ENCOUNTER — PATIENT MESSAGE (OUTPATIENT)
Dept: MEDICAL GROUP | Facility: PHYSICIAN GROUP | Age: 43
End: 2023-07-18
Payer: COMMERCIAL

## 2023-07-18 DIAGNOSIS — Z12.83 SKIN CANCER SCREENING: ICD-10-CM

## 2023-07-27 ENCOUNTER — OFFICE VISIT (OUTPATIENT)
Dept: MEDICAL GROUP | Facility: PHYSICIAN GROUP | Age: 43
End: 2023-07-27
Payer: COMMERCIAL

## 2023-07-27 VITALS
DIASTOLIC BLOOD PRESSURE: 62 MMHG | SYSTOLIC BLOOD PRESSURE: 98 MMHG | HEART RATE: 66 BPM | HEIGHT: 67 IN | BODY MASS INDEX: 23.98 KG/M2 | WEIGHT: 152.8 LBS | TEMPERATURE: 98 F | OXYGEN SATURATION: 96 %

## 2023-07-27 DIAGNOSIS — R00.2 PALPITATIONS: ICD-10-CM

## 2023-07-27 DIAGNOSIS — R53.82 CHRONIC FATIGUE: ICD-10-CM

## 2023-07-27 DIAGNOSIS — Z00.00 WELL WOMAN EXAM (NO GYNECOLOGICAL EXAM): ICD-10-CM

## 2023-07-27 DIAGNOSIS — F51.01 PRIMARY INSOMNIA: ICD-10-CM

## 2023-07-27 PROCEDURE — 3078F DIAST BP <80 MM HG: CPT | Performed by: FAMILY MEDICINE

## 2023-07-27 PROCEDURE — 3074F SYST BP LT 130 MM HG: CPT | Performed by: FAMILY MEDICINE

## 2023-07-27 PROCEDURE — 99214 OFFICE O/P EST MOD 30 MIN: CPT | Mod: 25 | Performed by: FAMILY MEDICINE

## 2023-07-27 PROCEDURE — 99396 PREV VISIT EST AGE 40-64: CPT | Performed by: FAMILY MEDICINE

## 2023-07-27 RX ORDER — TRAZODONE HYDROCHLORIDE 50 MG/1
50 TABLET ORAL NIGHTLY
Qty: 30 TABLET | Refills: 3 | Status: SHIPPED | OUTPATIENT
Start: 2023-07-27 | End: 2023-11-29

## 2023-07-27 SDOH — HEALTH STABILITY: PHYSICAL HEALTH: ON AVERAGE, HOW MANY DAYS PER WEEK DO YOU ENGAGE IN MODERATE TO STRENUOUS EXERCISE (LIKE A BRISK WALK)?: 3 DAYS

## 2023-07-27 SDOH — ECONOMIC STABILITY: HOUSING INSECURITY

## 2023-07-27 SDOH — HEALTH STABILITY: PHYSICAL HEALTH: ON AVERAGE, HOW MANY MINUTES DO YOU ENGAGE IN EXERCISE AT THIS LEVEL?: 30 MIN

## 2023-07-27 SDOH — ECONOMIC STABILITY: HOUSING INSECURITY
IN THE LAST 12 MONTHS, WAS THERE A TIME WHEN YOU DID NOT HAVE A STEADY PLACE TO SLEEP OR SLEPT IN A SHELTER (INCLUDING NOW)?: NO

## 2023-07-27 SDOH — ECONOMIC STABILITY: FOOD INSECURITY: WITHIN THE PAST 12 MONTHS, THE FOOD YOU BOUGHT JUST DIDN'T LAST AND YOU DIDN'T HAVE MONEY TO GET MORE.: NEVER TRUE

## 2023-07-27 SDOH — ECONOMIC STABILITY: INCOME INSECURITY: IN THE LAST 12 MONTHS, WAS THERE A TIME WHEN YOU WERE NOT ABLE TO PAY THE MORTGAGE OR RENT ON TIME?: NO

## 2023-07-27 SDOH — ECONOMIC STABILITY: TRANSPORTATION INSECURITY
IN THE PAST 12 MONTHS, HAS LACK OF TRANSPORTATION KEPT YOU FROM MEETINGS, WORK, OR FROM GETTING THINGS NEEDED FOR DAILY LIVING?: NO

## 2023-07-27 SDOH — ECONOMIC STABILITY: INCOME INSECURITY: HOW HARD IS IT FOR YOU TO PAY FOR THE VERY BASICS LIKE FOOD, HOUSING, MEDICAL CARE, AND HEATING?: NOT HARD AT ALL

## 2023-07-27 SDOH — ECONOMIC STABILITY: TRANSPORTATION INSECURITY
IN THE PAST 12 MONTHS, HAS THE LACK OF TRANSPORTATION KEPT YOU FROM MEDICAL APPOINTMENTS OR FROM GETTING MEDICATIONS?: NO

## 2023-07-27 SDOH — HEALTH STABILITY: MENTAL HEALTH
STRESS IS WHEN SOMEONE FEELS TENSE, NERVOUS, ANXIOUS, OR CAN'T SLEEP AT NIGHT BECAUSE THEIR MIND IS TROUBLED. HOW STRESSED ARE YOU?: TO SOME EXTENT

## 2023-07-27 SDOH — ECONOMIC STABILITY: TRANSPORTATION INSECURITY
IN THE PAST 12 MONTHS, HAS LACK OF RELIABLE TRANSPORTATION KEPT YOU FROM MEDICAL APPOINTMENTS, MEETINGS, WORK OR FROM GETTING THINGS NEEDED FOR DAILY LIVING?: NO

## 2023-07-27 ASSESSMENT — FIBROSIS 4 INDEX: FIB4 SCORE: 1.13

## 2023-07-27 ASSESSMENT — SOCIAL DETERMINANTS OF HEALTH (SDOH)
IN A TYPICAL WEEK, HOW MANY TIMES DO YOU TALK ON THE PHONE WITH FAMILY, FRIENDS, OR NEIGHBORS?: ONCE A WEEK
HOW OFTEN DO YOU ATTEND CHURCH OR RELIGIOUS SERVICES?: MORE THAN 4 TIMES PER YEAR
HOW HARD IS IT FOR YOU TO PAY FOR THE VERY BASICS LIKE FOOD, HOUSING, MEDICAL CARE, AND HEATING?: NOT HARD AT ALL
DO YOU BELONG TO ANY CLUBS OR ORGANIZATIONS SUCH AS CHURCH GROUPS UNIONS, FRATERNAL OR ATHLETIC GROUPS, OR SCHOOL GROUPS?: YES
HOW MANY DRINKS CONTAINING ALCOHOL DO YOU HAVE ON A TYPICAL DAY WHEN YOU ARE DRINKING: 1 OR 2
HOW OFTEN DO YOU HAVE SIX OR MORE DRINKS ON ONE OCCASION: NEVER
HOW OFTEN DO YOU ATTENT MEETINGS OF THE CLUB OR ORGANIZATION YOU BELONG TO?: 1 TO 4 TIMES PER YEAR
HOW OFTEN DO YOU HAVE A DRINK CONTAINING ALCOHOL: MONTHLY OR LESS
IN A TYPICAL WEEK, HOW MANY TIMES DO YOU TALK ON THE PHONE WITH FAMILY, FRIENDS, OR NEIGHBORS?: ONCE A WEEK
HOW OFTEN DO YOU ATTEND CHURCH OR RELIGIOUS SERVICES?: MORE THAN 4 TIMES PER YEAR
DO YOU BELONG TO ANY CLUBS OR ORGANIZATIONS SUCH AS CHURCH GROUPS UNIONS, FRATERNAL OR ATHLETIC GROUPS, OR SCHOOL GROUPS?: YES
HOW OFTEN DO YOU ATTENT MEETINGS OF THE CLUB OR ORGANIZATION YOU BELONG TO?: 1 TO 4 TIMES PER YEAR

## 2023-07-27 ASSESSMENT — LIFESTYLE VARIABLES
HOW MANY STANDARD DRINKS CONTAINING ALCOHOL DO YOU HAVE ON A TYPICAL DAY: 1 OR 2
AUDIT-C TOTAL SCORE: 1
SKIP TO QUESTIONS 9-10: 1
HOW OFTEN DO YOU HAVE A DRINK CONTAINING ALCOHOL: MONTHLY OR LESS
HOW OFTEN DO YOU HAVE SIX OR MORE DRINKS ON ONE OCCASION: NEVER

## 2023-07-27 ASSESSMENT — PATIENT HEALTH QUESTIONNAIRE - PHQ9: CLINICAL INTERPRETATION OF PHQ2 SCORE: 0

## 2023-07-27 NOTE — PROGRESS NOTES
Subjective:     CC:   Chief Complaint   Patient presents with    Annual Exam    Referral Needed       HPI:   Lorna Villa is a 43 y.o. female who presents for annual exam. She is feeling well and denies any complaints.    Ob-Gyn/ History:    Patient has GYN provider: yes  Last Pap Smear:  2021. no history of abnormal pap smears.  Gyn Surgery:  hysterectomy.    Health Maintenance  Diet: trying to eat healthy   Exercise: keeping active   Substance Abuse: none   Safe in relationship.  Seat belts, bike helmet, gun safety discussed.  Sun protection used.    Cancer screening  Colorectal Cancer Screening: not due    Breast Cancer Screening: UTD     Infectious disease screening/Immunizations  --Immunizations:    UTD    She  has a past medical history of Anemia, Anesthesia, Arrhythmia, Irregular periods, Strain of right hamstring muscle (2/9/2023), and Urinary incontinence.    She has no past medical history of Addisons disease (HCC), Adrenal disorder (HCC), Allergy, Anxiety, Blood transfusion without reported diagnosis, Clotting disorder (HCC), COPD (chronic obstructive pulmonary disease) (HCC), Cushings syndrome (HCC), Depression, Diabetic neuropathy (HCC), GERD (gastroesophageal reflux disease), Goiter, Head ache, HIV (human immunodeficiency virus infection) (HCC), Hyperlipidemia, IBD (inflammatory bowel disease), Meningitis, Migraine, Osteoporosis, Parathyroid disorder (HCC), Pituitary disease (HCC), Sickle cell disease (HCC), Substance abuse (HCC), or Urinary tract infection.  She  has a past surgical history that includes primary c section (12/1997); repeat c section (03/2000); repeat c section (12/2002); pr cystourethroscopy (N/A, 7/16/2021); hysterectomy robotic xi (N/A, 7/16/2021); and salpingo oophorectomy (Bilateral, 7/16/2021).    Family History   Problem Relation Age of Onset    Cancer Mother         ovarian    Cancer Maternal Grandmother         ovarian       Social History     Socioeconomic History     Marital status:      Spouse name: Not on file    Number of children: Not on file    Years of education: Not on file    Highest education level: Not on file   Occupational History    Not on file   Tobacco Use    Smoking status: Former     Packs/day: 0.50     Types: Cigarettes     Quit date:      Years since quittin.5    Smokeless tobacco: Never   Vaping Use    Vaping Use: Never used   Substance and Sexual Activity    Alcohol use: Yes     Alcohol/week: 1.2 oz     Types: 2 Glasses of wine per week     Comment: weekends    Drug use: Never    Sexual activity: Yes     Partners: Male     Birth control/protection: Patch   Other Topics Concern    Not on file   Social History Narrative    ** Merged History Encounter **          Social Determinants of Health     Financial Resource Strain: Low Risk  (2023)    Overall Financial Resource Strain (CARDIA)     Difficulty of Paying Living Expenses: Not hard at all   Food Insecurity: Unknown (2023)    Hunger Vital Sign     Worried About Running Out of Food in the Last Year: Not on file     Ran Out of Food in the Last Year: Never true   Transportation Needs: No Transportation Needs (2023)    PRAPARE - Transportation     Lack of Transportation (Medical): No     Lack of Transportation (Non-Medical): No   Physical Activity: Insufficiently Active (2023)    Exercise Vital Sign     Days of Exercise per Week: 3 days     Minutes of Exercise per Session: 30 min   Stress: Stress Concern Present (2023)    [employer] [place] [employer] - Occupational Stress Questionnaire     Feeling of Stress : To some extent   Social Connections: Unknown (2023)    Social Connection and Isolation Panel [NHANES]     Frequency of Communication with Friends and Family: Once a week     Frequency of Social Gatherings with Friends and Family: Not on file     Attends [employer] Services: More than 4 times per year     Active Member of Clubs or Organizations: Yes  "    Attends Club or Organization Meetings: 1 to 4 times per year     Marital Status:    Intimate Partner Violence: Not on file   Housing Stability: Unknown (7/27/2023)    Housing Stability Vital Sign     Unable to Pay for Housing in the Last Year: No     Number of Places Lived in the Last Year: Not on file     Unstable Housing in the Last Year: No       Patient Active Problem List    Diagnosis Date Noted    Chronic fatigue 07/27/2023    Palpitations 07/27/2023    Strain of right hamstring muscle 02/09/2023    Weight gain 02/09/2023    Family history of ovarian cancer 07/16/2021    Intramural, submucous, and subserous leiomyoma of uterus 06/22/2021    Abnormal uterine bleeding (AUB) 06/22/2021    Irregular periods 02/25/2020         Current Outpatient Medications   Medication Sig Dispense Refill    traZODone (DESYREL) 50 MG Tab Take 1 Tablet by mouth every evening. 30 Tablet 3    estradiol (CLIMARA) 0.075 MG/24HR PATCH WEEKLY APPLY 1 PATCH TOPICALLY TO THE SKIN EVERY 7 DAYS 4 Patch 0     No current facility-administered medications for this visit.     Allergies   Allergen Reactions    Demerol Hives    Lexapro Anaphylaxis     \"go blind\" shock       Review of Systems   Constitutional: Negative for fever, chills and malaise/fatigue.   HENT: Negative for congestion.    Eyes: Negative for pain.   Respiratory: Negative for cough and shortness of breath.    Cardiovascular: Negative for leg swelling.   Gastrointestinal: Negative for nausea, vomiting, abdominal pain and diarrhea.   Genitourinary: Negative for dysuria and hematuria.   Skin: Negative for rash.   Neurological: Negative for dizziness, focal weakness and headaches.   Endo/Heme/Allergies: Does not bruise/bleed easily.   Psychiatric/Behavioral: Negative for depression.  The patient is not nervous/anxious.      Objective:     BP 98/62 (BP Location: Left arm, Patient Position: Sitting, BP Cuff Size: Adult)   Pulse 66   Temp 36.7 °C (98 °F) (Temporal)   Ht " "1.702 m (5' 7\")   Wt 69.3 kg (152 lb 12.8 oz)   LMP 06/10/2021   SpO2 96%   BMI 23.93 kg/m²   Body mass index is 23.93 kg/m².  Wt Readings from Last 4 Encounters:   07/27/23 69.3 kg (152 lb 12.8 oz)   02/09/23 75.1 kg (165 lb 9.6 oz)   12/16/22 73.9 kg (163 lb)   11/03/22 73.9 kg (163 lb)       Physical Exam:  Constitutional: Well-developed and well-nourished. Not diaphoretic. No distress.   Skin: Skin is warm and dry. No rash noted.  Head: Atraumatic without lesions.  Eyes: Conjunctivae and extraocular motions are normal. Pupils are equal, round, and reactive to light. No scleral icterus.   Ears:  External ears unremarkable. Tympanic membranes clear and intact.  Nose: Nares patent. Septum midline. Turbinates without erythema nor edema. No discharge.   Mouth/Throat: Dentition is normal. Tongue normal. Oropharynx is clear and moist. Posterior pharynx without erythema or exudates.  Neck: Supple, trachea midline. Normal range of motion. No thyromegaly present. No lymphadenopathy--cervical or supraclavicular.  Cardiovascular: Regular rate and rhythm, S1 and S2 without murmur, rubs, or gallops.  Lungs: Normal inspiratory effort, CTA bilaterally, no wheezes/rhonchi/rales  Abdomen: Soft, non tender, and without distention. Active bowel sounds in all four quadrants. No rebound, guarding, masses or HSM.  Extremities: No cyanosis, clubbing, erythema, nor edema. Distal pulses intact and symmetric.   Musculoskeletal: All major joints AROM full in all directions without pain.  Neurological: Alert and oriented x 3.   Psychiatric:  Behavior, mood, and affect are appropriate.    Assessment and Plan:     1. Chronic fatigue     The patient endorses fatigue and upon further questioning she mentions that she has been experiencing insomnia.  See problem #2.   2. Primary insomnia  traZODone (DESYREL) 50 MG Tab   , Uncontrolled condition.  Patient amenable to a trial of trazodone and monitor fatigue improvement.   3. Palpitations  " REFERRAL TO CARDIOLOGY   Chronic, stable condition.  May be worsening.  As such I will refer to a cardiologist.  Continue to cut down on caffeine.     4. Well woman exam (no gynecological exam)    HCM:    Patient counseled about skin care, diet, supplements, prenatal vitamins, safe sex and exercise.    I discussed with the pt the likelihood of costs associated with double billing for an acute & AWV. Parent is aware they may receive a bill for additional services and/or copayment.      Follow-up: No follow-ups on file.

## 2023-08-02 ENCOUNTER — TELEPHONE (OUTPATIENT)
Dept: HEALTH INFORMATION MANAGEMENT | Facility: OTHER | Age: 43
End: 2023-08-02
Payer: COMMERCIAL

## 2023-08-04 ENCOUNTER — TELEPHONE (OUTPATIENT)
Dept: CARDIOLOGY | Facility: MEDICAL CENTER | Age: 43
End: 2023-08-04
Payer: COMMERCIAL

## 2023-08-04 NOTE — TELEPHONE ENCOUNTER
Called patient in regards to records for NP appointment with Dr. BRYSON. To review most recent lab results, ekg, any cardiac testing or ,if she has been treated by a cardiologist. No answer, left voicemail to call back

## 2023-08-08 ENCOUNTER — OFFICE VISIT (OUTPATIENT)
Dept: CARDIOLOGY | Facility: MEDICAL CENTER | Age: 43
End: 2023-08-08
Attending: FAMILY MEDICINE
Payer: COMMERCIAL

## 2023-08-08 VITALS
HEART RATE: 59 BPM | OXYGEN SATURATION: 95 % | SYSTOLIC BLOOD PRESSURE: 100 MMHG | WEIGHT: 150 LBS | RESPIRATION RATE: 14 BRPM | BODY MASS INDEX: 23.49 KG/M2 | DIASTOLIC BLOOD PRESSURE: 68 MMHG

## 2023-08-08 DIAGNOSIS — I47.10 PAROXYSMAL SUPRAVENTRICULAR TACHYCARDIA (HCC): ICD-10-CM

## 2023-08-08 LAB — EKG IMPRESSION: NORMAL

## 2023-08-08 PROCEDURE — 99203 OFFICE O/P NEW LOW 30 MIN: CPT | Mod: 25 | Performed by: INTERNAL MEDICINE

## 2023-08-08 PROCEDURE — 3078F DIAST BP <80 MM HG: CPT | Performed by: INTERNAL MEDICINE

## 2023-08-08 PROCEDURE — 93005 ELECTROCARDIOGRAM TRACING: CPT | Performed by: INTERNAL MEDICINE

## 2023-08-08 PROCEDURE — 93010 ELECTROCARDIOGRAM REPORT: CPT | Performed by: INTERNAL MEDICINE

## 2023-08-08 PROCEDURE — 99212 OFFICE O/P EST SF 10 MIN: CPT | Performed by: INTERNAL MEDICINE

## 2023-08-08 PROCEDURE — 3074F SYST BP LT 130 MM HG: CPT | Performed by: INTERNAL MEDICINE

## 2023-08-08 ASSESSMENT — FIBROSIS 4 INDEX: FIB4 SCORE: 1.13

## 2023-08-08 NOTE — PROGRESS NOTES
Interventional cardiology Initial Consultation Note      Chief Complaint: palpitations    Lorna Villa is a 43 y.o. female  patient presented today for many years. Lasts for 2-3 minutes. Really strong, associated with lightheadedness.  She had it in the past, it had been several years in between but recently it flared up again, having frequent palpitations currently.  They are very uncomfortable, she feels tired for couple days after that episode.        Past Medical History:   Diagnosis Date    Anemia     Anesthesia     Severe shakes    Arrhythmia     Feels intermittent rapid heart rate    Irregular periods     Strain of right hamstring muscle 2/9/2023    Urinary incontinence     stress incontinence             Current Outpatient Medications   Medication Sig Dispense Refill    estradiol (CLIMARA) 0.075 MG/24HR PATCH WEEKLY APPLY 1 PATCH TOPICALLY TO THE SKIN EVERY 7 DAYS 4 Patch 0    traZODone (DESYREL) 50 MG Tab Take 1 Tablet by mouth every evening. (Patient not taking: Reported on 8/8/2023) 30 Tablet 3     No current facility-administered medications for this visit.             Physical Exam:  Ambulatory Vitals  /68   Pulse (!) 59   Resp 14   Wt 68 kg (150 lb)   SpO2 95%    Oxygen Therapy:  Pulse Oximetry: 95 %  BP Readings from Last 4 Encounters:   08/08/23 100/68   07/27/23 98/62   02/09/23 110/70   12/16/22 120/60       Weight/BMI: Body mass index is 23.49 kg/m² (pended).  Wt Readings from Last 4 Encounters:   08/08/23 68 kg (150 lb)   07/27/23 69.3 kg (152 lb 12.8 oz)   02/09/23 75.1 kg (165 lb 9.6 oz)   12/16/22 73.9 kg (163 lb)           General: Well appearing and in no apparent distress  Neck: carotid bruits absent  Lungs: respiratory sounds  normal  Heart: Regular rhythm,  No palpable thrills on palpation, murmurs absent, no rubs,   Lower extremity edema absent.     EKG today shows sinus bradycardia.  No preexcitation    Medical Decision Making:  Problem List Items Addressed This  Visit    None  Visit Diagnoses       Paroxysmal supraventricular tachycardia (HCC)        Relevant Orders    Cardiac Event Monitor    EC-ECHOCARDIOGRAM COMPLETE W/O CONT    EKG (Completed)          43-year-old female patient with episodic palpitations, likely paroxysmal supraventricular tachycardia.  Vagal maneuvers discussed.  Will arrange a Zio patch, echocardiogram.  Further recommendations based on test results.  If she does have SVT, recommend starting metoprolol 25 mg daily, referral to electrophysiology.      This note was dictated using Dragon speech recognition software.    Haris VIRGEN  Interventional cardiologist  [facility], [facility]  [address] [address]  Phone: [phone]  Fax: [phone]

## 2023-08-09 ENCOUNTER — NON-PROVIDER VISIT (OUTPATIENT)
Dept: CARDIOLOGY | Facility: MEDICAL CENTER | Age: 43
End: 2023-08-09
Attending: INTERNAL MEDICINE
Payer: COMMERCIAL

## 2023-08-09 DIAGNOSIS — I49.1 PREMATURE ATRIAL CONTRACTIONS: ICD-10-CM

## 2023-08-09 DIAGNOSIS — I47.10 PAROXYSMAL SUPRAVENTRICULAR TACHYCARDIA (HCC): ICD-10-CM

## 2023-08-09 DIAGNOSIS — I49.3 PVCS (PREMATURE VENTRICULAR CONTRACTIONS): ICD-10-CM

## 2023-08-09 DIAGNOSIS — R00.1 SINUS BRADYCARDIA: ICD-10-CM

## 2023-08-09 PROCEDURE — 93246 EXT ECG>7D<15D RECORDING: CPT

## 2023-08-09 NOTE — PROGRESS NOTES
Patient enrolled in the 14 day ePatch Holter monitoring program per Haris Ornelas MD.  >Office hook-up, serial # [id number].  >Currently pending EOS.

## 2023-08-21 ENCOUNTER — TELEPHONE (OUTPATIENT)
Dept: HEALTH INFORMATION MANAGEMENT | Facility: OTHER | Age: 43
End: 2023-08-21

## 2023-09-05 ENCOUNTER — TELEPHONE (OUTPATIENT)
Dept: CARDIOLOGY | Facility: MEDICAL CENTER | Age: 43
End: 2023-09-05
Payer: COMMERCIAL

## 2023-09-05 DIAGNOSIS — I47.10 PAROXYSMAL SUPRAVENTRICULAR TACHYCARDIA (HCC): ICD-10-CM

## 2023-09-06 ENCOUNTER — HOSPITAL ENCOUNTER (OUTPATIENT)
Dept: CARDIOLOGY | Facility: MEDICAL CENTER | Age: 43
End: 2023-09-06
Attending: INTERNAL MEDICINE
Payer: COMMERCIAL

## 2023-09-06 DIAGNOSIS — I47.10 PAROXYSMAL SUPRAVENTRICULAR TACHYCARDIA (HCC): ICD-10-CM

## 2023-09-06 PROCEDURE — 93306 TTE W/DOPPLER COMPLETE: CPT

## 2023-09-06 PROCEDURE — 93248 EXT ECG>7D<15D REV&INTERPJ: CPT | Performed by: INTERNAL MEDICINE

## 2023-09-07 LAB
LV EJECT FRACT  99904: 55
LV EJECT FRACT MOD 4C 99902: 43.94

## 2023-09-07 PROCEDURE — 93306 TTE W/DOPPLER COMPLETE: CPT | Mod: 26 | Performed by: INTERNAL MEDICINE

## 2023-11-17 ENCOUNTER — HOSPITAL ENCOUNTER (OUTPATIENT)
Dept: RADIOLOGY | Facility: MEDICAL CENTER | Age: 43
End: 2023-11-17
Attending: ADVANCED PRACTICE MIDWIFE
Payer: COMMERCIAL

## 2023-11-17 DIAGNOSIS — Z12.31 VISIT FOR SCREENING MAMMOGRAM: ICD-10-CM

## 2023-11-17 PROCEDURE — 77063 BREAST TOMOSYNTHESIS BI: CPT

## 2023-11-29 ENCOUNTER — OFFICE VISIT (OUTPATIENT)
Dept: CARDIOLOGY | Facility: MEDICAL CENTER | Age: 43
End: 2023-11-29
Attending: NURSE PRACTITIONER
Payer: COMMERCIAL

## 2023-11-29 VITALS
DIASTOLIC BLOOD PRESSURE: 66 MMHG | SYSTOLIC BLOOD PRESSURE: 116 MMHG | BODY MASS INDEX: 22.76 KG/M2 | HEIGHT: 67 IN | OXYGEN SATURATION: 97 % | HEART RATE: 82 BPM | WEIGHT: 145 LBS

## 2023-11-29 DIAGNOSIS — R00.2 PALPITATIONS: ICD-10-CM

## 2023-11-29 PROCEDURE — 3078F DIAST BP <80 MM HG: CPT | Performed by: NURSE PRACTITIONER

## 2023-11-29 PROCEDURE — 3074F SYST BP LT 130 MM HG: CPT | Performed by: NURSE PRACTITIONER

## 2023-11-29 PROCEDURE — 99211 OFF/OP EST MAY X REQ PHY/QHP: CPT | Performed by: NURSE PRACTITIONER

## 2023-11-29 PROCEDURE — 99213 OFFICE O/P EST LOW 20 MIN: CPT | Performed by: NURSE PRACTITIONER

## 2023-11-29 RX ORDER — ESTRADIOL 1 MG/1
1 TABLET ORAL DAILY
COMMUNITY
Start: 2023-10-13 | End: 2023-11-29

## 2023-11-29 RX ORDER — ESTRADIOL 0.5 MG/1
0.5 TABLET ORAL DAILY
COMMUNITY
Start: 2023-10-23

## 2023-11-29 RX ORDER — PROGESTERONE 100 MG/1
CAPSULE ORAL
COMMUNITY
Start: 2023-10-13 | End: 2023-11-29

## 2023-11-29 ASSESSMENT — ENCOUNTER SYMPTOMS
ABDOMINAL PAIN: 0
COUGH: 0
CLAUDICATION: 0
ORTHOPNEA: 0
PALPITATIONS: 1
MYALGIAS: 0
DIZZINESS: 0
SHORTNESS OF BREATH: 0
FEVER: 0
PND: 0

## 2023-11-29 ASSESSMENT — FIBROSIS 4 INDEX: FIB4 SCORE: 1.13

## 2023-11-29 NOTE — PROGRESS NOTES
Chief Complaint   Patient presents with    Supraventricular Tachycardia (SVT)     F/v dx: Paroxysmal supraventricular tachycardia (HCC)     Subjective     Lorna Villa is a 43 y.o. female who presents today for palpitations.    She     Past Medical History:   Diagnosis Date    Anemia     Anesthesia     Severe shakes    Arrhythmia     Feels intermittent rapid heart rate    Irregular periods     Strain of right hamstring muscle 2023    Urinary incontinence     stress incontinence     Past Surgical History:   Procedure Laterality Date    UT CYSTOURETHROSCOPY N/A 2021    Procedure: CYSTOSCOPY.;  Surgeon: Swati Ruiz D.O.;  Location: SURGERY [facility];  Service: Gyn Robotic    HYSTERECTOMY ROBOTIC XI N/A 2021    Procedure: HYSTERECTOMY, ROBOT-ASSISTED, USING DA NATHALIE XI - TOTAL, Cystotomy repair;  Surgeon: Swati Ruiz D.O.;  Location: SURGERY [facility];  Service: Gyn Robotic    SALPINGO OOPHORECTOMY Bilateral 2021    Procedure: SALPINGO-OOPHORECTOMY;  Surgeon: Swati Ruiz D.O.;  Location: SURGERY [facility];  Service: Gyn Robotic    REPEAT C SECTION  2002    REPEAT C SECTION  2000    PRIMARY C SECTION  1997     Family History   Problem Relation Age of Onset    Cancer Mother         ovarian    Cancer Maternal Grandmother         ovarian     Social History     Socioeconomic History    Marital status:      Spouse name: Not on file    Number of children: Not on file    Years of education: Not on file    Highest education level: Not on file   Occupational History    Not on file   Tobacco Use    Smoking status: Former     Current packs/day: 0.00     Types: Cigarettes     Quit date:      Years since quittin.9    Smokeless tobacco: Never   Vaping Use    Vaping Use: Never used   Substance and Sexual Activity    Alcohol use: Yes     Alcohol/week: 1.2 oz     Types: 2 Glasses of wine per week     Comment: occ    Drug use: Never    Sexual activity: Yes      "Partners: Male     Birth control/protection: Patch   Other Topics Concern    Not on file   Social History Narrative    ** Merged History Encounter **          Social Determinants of Health     Financial Resource Strain: Low Risk  (7/27/2023)    Overall Financial Resource Strain (CARDIA)     Difficulty of Paying Living Expenses: Not hard at all   Food Insecurity: Unknown (7/27/2023)    Hunger Vital Sign     Worried About Running Out of Food in the Last Year: Not on file     Ran Out of Food in the Last Year: Never true   Transportation Needs: No Transportation Needs (7/27/2023)    PRAPARE - Transportation     Lack of Transportation (Medical): No     Lack of Transportation (Non-Medical): No   Physical Activity: Insufficiently Active (7/27/2023)    Exercise Vital Sign     Days of Exercise per Week: 3 days     Minutes of Exercise per Session: 30 min   Stress: Stress Concern Present (7/27/2023)    [employer] - Occupational Stress Questionnaire     Feeling of Stress : To some extent   Social Connections: Unknown (7/27/2023)    Social Connection and Isolation Panel [NHANES]     Frequency of Communication with Friends and Family: Once a week     Frequency of Social Gatherings with Friends and Family: Not on file     Attends [employer] Services: More than 4 times per year     Active Member of Clubs or Organizations: Yes     Attends Club or Organization Meetings: 1 to 4 times per year     Marital Status:    Intimate Partner Violence: Not on file   Housing Stability: Unknown (7/27/2023)    Housing Stability Vital Sign     Unable to Pay for Housing in the Last Year: No     Number of Places Lived in the Last Year: Not on file     Unstable Housing in the Last Year: No     Allergies   Allergen Reactions    Demerol Hives    Lexapro Anaphylaxis     \"go blind\" shock     Outpatient Encounter Medications as of 11/29/2023   Medication Sig Dispense Refill    estradiol (ESTRACE) 0.5 MG tablet Take 0.5 mg " "by mouth every day.      [DISCONTINUED] estradiol (ESTRACE) 1 MG Tab Take 1 mg by mouth every day. (Patient not taking: Reported on 11/29/2023)      [DISCONTINUED] progesterone (PROMETRIUM) 100 MG Cap TAKE 1 CAPSULE BY MOUTH ONCE DAILY NIGHTLY (Patient not taking: Reported on 11/29/2023)      [DISCONTINUED] traZODone (DESYREL) 50 MG Tab Take 1 Tablet by mouth every evening. (Patient not taking: Reported on 8/8/2023) 30 Tablet 3    [DISCONTINUED] estradiol (CLIMARA) 0.075 MG/24HR PATCH WEEKLY APPLY 1 PATCH TOPICALLY TO THE SKIN EVERY 7 DAYS 4 Patch 0     No facility-administered encounter medications on file as of 11/29/2023.     Review of Systems   Constitutional:  Negative for fever and malaise/fatigue.   Respiratory:  Negative for cough and shortness of breath.    Cardiovascular:  Positive for palpitations. Negative for chest pain, orthopnea, claudication, leg swelling and PND.        At rest and at bedtime intermittently   Gastrointestinal:  Negative for abdominal pain.   Musculoskeletal:  Negative for myalgias.   Neurological:  Negative for dizziness.              Objective     /66 (BP Location: Left arm, Patient Position: Sitting, BP Cuff Size: Adult)   Pulse 82   Ht 1.702 m (5' 7\")   Wt 65.8 kg (145 lb)   LMP 06/10/2021   SpO2 97%   BMI 22.71 kg/m²     Physical Exam  Vitals and nursing note reviewed.   Constitutional:       Appearance: Normal appearance. She is well-developed and normal weight.   HENT:      Head: Normocephalic and atraumatic.   Neck:      Vascular: No JVD.   Cardiovascular:      Rate and Rhythm: Normal rate and regular rhythm.      Pulses: Normal pulses.      Heart sounds: Normal heart sounds.   Pulmonary:      Effort: Pulmonary effort is normal.      Breath sounds: Normal breath sounds.   Musculoskeletal:         General: Normal range of motion.   Skin:     General: Skin is warm and dry.      Capillary Refill: Capillary refill takes less than 2 seconds.   Neurological:      " General: No focal deficit present.      Mental Status: She is alert and oriented to person, place, and time. Mental status is at baseline.   Psychiatric:         Mood and Affect: Mood normal.         Behavior: Behavior normal.         Thought Content: Thought content normal.         Judgment: Judgment normal.                Assessment & Plan     1. Palpitations          Medical Decision Making: Today's Assessment/Status/Plan:      1. Palpitations  -intermittent, more at rest and during sleep  -can trial bb therapy if wanted, but prefers no medications at this time  -echo unremarkable  -cont to follow with heart monitoring devices at home  -check thyroid panel and with PCP  -heart monitor with NSR with few PAC, PVC's, benign     Patient is to follow up with our office PRN for worsening palpitations or abnormal EKG strips from home.

## 2024-07-22 ENCOUNTER — OFFICE VISIT (OUTPATIENT)
Dept: URGENT CARE | Facility: PHYSICIAN GROUP | Age: 44
End: 2024-07-22
Payer: COMMERCIAL

## 2024-07-22 ENCOUNTER — HOSPITAL ENCOUNTER (OUTPATIENT)
Facility: MEDICAL CENTER | Age: 44
End: 2024-07-22
Attending: NURSE PRACTITIONER
Payer: COMMERCIAL

## 2024-07-22 VITALS
WEIGHT: 148 LBS | DIASTOLIC BLOOD PRESSURE: 76 MMHG | BODY MASS INDEX: 23.18 KG/M2 | SYSTOLIC BLOOD PRESSURE: 114 MMHG | HEART RATE: 96 BPM | OXYGEN SATURATION: 98 % | TEMPERATURE: 97.6 F | RESPIRATION RATE: 14 BRPM

## 2024-07-22 DIAGNOSIS — R39.9 UTI SYMPTOMS: ICD-10-CM

## 2024-07-22 DIAGNOSIS — R10.2 PELVIC PRESSURE IN FEMALE: ICD-10-CM

## 2024-07-22 DIAGNOSIS — Z98.890 HX OF BLADDER REPAIR SURGERY: ICD-10-CM

## 2024-07-22 LAB
APPEARANCE UR: CLEAR
BILIRUB UR STRIP-MCNC: NEGATIVE MG/DL
CANDIDA DNA VAG QL PROBE+SIG AMP: NEGATIVE
COLOR UR AUTO: YELLOW
G VAGINALIS DNA VAG QL PROBE+SIG AMP: NEGATIVE
GLUCOSE UR STRIP.AUTO-MCNC: NEGATIVE MG/DL
KETONES UR STRIP.AUTO-MCNC: NEGATIVE MG/DL
LEUKOCYTE ESTERASE UR QL STRIP.AUTO: NEGATIVE
NITRITE UR QL STRIP.AUTO: NEGATIVE
PH UR STRIP.AUTO: 6 [PH] (ref 5–8)
PROT UR QL STRIP: NEGATIVE MG/DL
RBC UR QL AUTO: NEGATIVE
SP GR UR STRIP.AUTO: 1.01
T VAGINALIS DNA VAG QL PROBE+SIG AMP: NEGATIVE
UROBILINOGEN UR STRIP-MCNC: NEGATIVE MG/DL

## 2024-07-22 PROCEDURE — 3078F DIAST BP <80 MM HG: CPT | Performed by: NURSE PRACTITIONER

## 2024-07-22 PROCEDURE — 81002 URINALYSIS NONAUTO W/O SCOPE: CPT | Performed by: NURSE PRACTITIONER

## 2024-07-22 PROCEDURE — 3074F SYST BP LT 130 MM HG: CPT | Performed by: NURSE PRACTITIONER

## 2024-07-22 PROCEDURE — 87510 GARDNER VAG DNA DIR PROBE: CPT

## 2024-07-22 PROCEDURE — 87660 TRICHOMONAS VAGIN DIR PROBE: CPT

## 2024-07-22 PROCEDURE — 87480 CANDIDA DNA DIR PROBE: CPT

## 2024-07-22 PROCEDURE — 99213 OFFICE O/P EST LOW 20 MIN: CPT | Performed by: NURSE PRACTITIONER

## 2024-07-22 PROCEDURE — 87086 URINE CULTURE/COLONY COUNT: CPT

## 2024-07-22 RX ORDER — CEFDINIR 300 MG/1
300 CAPSULE ORAL 2 TIMES DAILY
Qty: 10 CAPSULE | Refills: 0 | Status: SHIPPED | OUTPATIENT
Start: 2024-07-22 | End: 2024-07-27

## 2024-07-22 ASSESSMENT — FIBROSIS 4 INDEX: FIB4 SCORE: 1.16

## 2024-07-24 LAB
BACTERIA UR CULT: NORMAL
SIGNIFICANT IND 70042: NORMAL
SITE SITE: NORMAL
SOURCE SOURCE: NORMAL

## 2024-10-17 ENCOUNTER — OFFICE VISIT (OUTPATIENT)
Dept: URGENT CARE | Facility: PHYSICIAN GROUP | Age: 44
End: 2024-10-17
Payer: COMMERCIAL

## 2024-10-17 VITALS
HEIGHT: 67 IN | BODY MASS INDEX: 22.85 KG/M2 | HEART RATE: 72 BPM | WEIGHT: 145.6 LBS | RESPIRATION RATE: 20 BRPM | TEMPERATURE: 98.1 F | DIASTOLIC BLOOD PRESSURE: 66 MMHG | OXYGEN SATURATION: 96 % | SYSTOLIC BLOOD PRESSURE: 122 MMHG

## 2024-10-17 DIAGNOSIS — U07.1 COVID-19: ICD-10-CM

## 2024-10-17 LAB
FLUAV RNA SPEC QL NAA+PROBE: NEGATIVE
FLUBV RNA SPEC QL NAA+PROBE: NEGATIVE
RSV RNA SPEC QL NAA+PROBE: NEGATIVE
S PYO DNA SPEC NAA+PROBE: NOT DETECTED
SARS-COV-2 RNA RESP QL NAA+PROBE: POSITIVE

## 2024-10-17 PROCEDURE — 3074F SYST BP LT 130 MM HG: CPT | Performed by: FAMILY MEDICINE

## 2024-10-17 PROCEDURE — 99213 OFFICE O/P EST LOW 20 MIN: CPT | Performed by: FAMILY MEDICINE

## 2024-10-17 PROCEDURE — 3078F DIAST BP <80 MM HG: CPT | Performed by: FAMILY MEDICINE

## 2024-10-17 PROCEDURE — 87651 STREP A DNA AMP PROBE: CPT | Performed by: FAMILY MEDICINE

## 2024-10-17 PROCEDURE — 0241U POCT CEPHEID COV-2, FLU A/B, RSV - PCR: CPT | Performed by: FAMILY MEDICINE

## 2024-10-17 ASSESSMENT — FIBROSIS 4 INDEX: FIB4 SCORE: 1.16

## 2025-01-28 ENCOUNTER — GYNECOLOGY VISIT (OUTPATIENT)
Dept: GYNECOLOGY | Facility: CLINIC | Age: 45
End: 2025-01-28
Payer: COMMERCIAL

## 2025-01-28 VITALS — DIASTOLIC BLOOD PRESSURE: 69 MMHG | SYSTOLIC BLOOD PRESSURE: 109 MMHG

## 2025-01-28 DIAGNOSIS — N39.42 INCONTINENCE WITHOUT SENSORY AWARENESS: ICD-10-CM

## 2025-01-28 DIAGNOSIS — N39.0 FREQUENT UTI: ICD-10-CM

## 2025-01-28 DIAGNOSIS — N39.8 VOIDING DYSFUNCTION: Primary | ICD-10-CM

## 2025-01-28 LAB
APPEARANCE UR: CLEAR
BILIRUB UR STRIP-MCNC: NEGATIVE MG/DL
COLOR UR AUTO: YELLOW
GLUCOSE UR STRIP.AUTO-MCNC: 100 MG/DL
KETONES UR STRIP.AUTO-MCNC: NEGATIVE MG/DL
LEUKOCYTE ESTERASE UR QL STRIP.AUTO: NEGATIVE
NITRITE UR QL STRIP.AUTO: NEGATIVE
PH UR STRIP.AUTO: 6 [PH] (ref 5–8)
PROT UR QL STRIP: NEGATIVE MG/DL
RBC UR QL AUTO: NEGATIVE
SP GR UR STRIP.AUTO: 1.01
UROBILINOGEN UR STRIP-MCNC: 0.2 MG/DL

## 2025-01-28 PROCEDURE — 81002 URINALYSIS NONAUTO W/O SCOPE: CPT | Performed by: STUDENT IN AN ORGANIZED HEALTH CARE EDUCATION/TRAINING PROGRAM

## 2025-01-28 PROCEDURE — 99214 OFFICE O/P EST MOD 30 MIN: CPT | Performed by: STUDENT IN AN ORGANIZED HEALTH CARE EDUCATION/TRAINING PROGRAM

## 2025-01-28 RX ORDER — SERTRALINE HYDROCHLORIDE 25 MG/1
25 TABLET, FILM COATED ORAL DAILY
COMMUNITY

## 2025-01-28 NOTE — PROGRESS NOTES
PT here today for consult   Ref for pelvic pressure  Hysterectomy? 2021  Good #: [phone]   PVR : 175  mL   Pharmacy Verified

## 2025-01-28 NOTE — PROGRESS NOTES
Urogynecology & Reconstructive Pelvic Surgery    Lorna Villa MRN:[id number] :1980    Referred by: Rosalba BECKETT    Reason for Visit:   Chief Complaint   Patient presents with   • New Patient     Consult          Subjective     History of Presenting Illness:    Lorna Villa is a 44 y.o. P3 who presents for the evaluation and management of bladder dysfunction      She reports difficulty feeling sensation within her bladder ever since she underwent a hysterectomy for a very large fibroid uterus, C/B cystotomy.    She leaks throughout the day with small drops, usually without sensation.  She does not report significant leakage with activities or with her urgency.  This is worse with a full bladder.    She has gone to urgent care a few times for pressure symptoms every 6 months, cultures negative.     Prior Pelvic surgery:   21: Robotic hysterectomy, bilateral salpingo-oophorectomy ([facility]) for family h/o ovarian cancer and large fibroids     Prior treatment:   Estradiol HRT      Pelvic floor symptom review:     Bladder:   Voids per day: 3 Voids per night: 0      Urinary incontinence episodes per day: Throughout the day, without sensation   Urge leakage:  Full Bladder   Stress leakage: With Cough   Continuous / insensible urine loss: Yes   Nocturnal enuresis: No    Leakage volume: Drops   Number of pads/day: sometimes    Bladder emptying: Incomplete   Voiding symptoms: Double or Triple Voiding   UTI in last 12 months: unclear   Other urologic history: none      Prolapse:     Prolapse symptoms: none     Sexual function:    Sexually active: Yes   Gender of partners: Male   Pain with intercourse: No        Past medical and surgical history    Past medical history:  Past Medical History:   Diagnosis Date   • Strain of right hamstring muscle 2023   • Anemia    • Anesthesia     Severe shakes   • Arrhythmia     Feels intermittent rapid heart rate   • Irregular periods    • Urinary  incontinence     stress incontinence     Past surgical history:  Past Surgical History:   Procedure Laterality Date   • NE CYSTOURETHROSCOPY N/A 07/16/2021    Procedure: CYSTOSCOPY.;  Surgeon: Swati Ruiz D.O.;  Location: SURGERY [facility];  Service: Gyn Robotic   • HYSTERECTOMY ROBOTIC XI N/A 07/16/2021    Procedure: HYSTERECTOMY, ROBOT-ASSISTED, USING DA NATHALIE XI - TOTAL, Cystotomy repair;  Surgeon: Swati Ruiz D.O.;  Location: SURGERY [facility];  Service: Gyn Robotic   • SALPINGO OOPHORECTOMY Bilateral 07/16/2021    Procedure: SALPINGO-OOPHORECTOMY;  Surgeon: Swati Ruiz D.O.;  Location: SURGERY [facility];  Service: Gyn Robotic   • REPEAT C SECTION  12/2002   • REPEAT C SECTION  03/2000   • PRIMARY C SECTION  12/1997   • OPEN REDUCTION       Medications:has a current medication list which includes the following prescription(s): sertraline and estradiol.  Allergies:Demerol and Lexapro  Family history:  Family History   Problem Relation Age of Onset   • Cancer Mother         ovarian   • Cancer Maternal Grandmother         ovarian     Social history: reports that she quit smoking about 7 years ago. Her smoking use included cigarettes. She has never used smokeless tobacco. She reports current alcohol use of about 1.2 oz of alcohol per week. She reports that she does not use drugs.    Review of systems: A full review of systems was performed, and negative with the exception of want is noted above in the HPI.        Objective        /69   LMP 06/10/2021     Physical Exam  Vitals reviewed. Exam conducted with a chaperone present.   Constitutional:       Appearance: Normal appearance.   HENT:      Head: Normocephalic.      Mouth/Throat:      Mouth: Mucous membranes are moist.   Cardiovascular:      Rate and Rhythm: Normal rate.   Pulmonary:      Effort: Pulmonary effort is normal.   Skin:     General: Skin is warm and dry.   Neurological:      Mental Status: She is alert.   Psychiatric:          Mood and Affect: Mood normal.     Procedure Performed: No    Diagnostic test and records review:     Post-void residual: 175 mL, performed by Bladder Scanner    Labs: n/a    Radiology: n/a    Procedural: n/a         Assessment & Plan     Lorna Villa is a 44 y.o. P3 with voiding dysfunction and urine loss without sensation, after prior pelvic surgery. We discussed my recommendations for further diagnosis and treatment at length today.     1. Voiding dysfunction  2. Incontinence without sensory awareness  Her voiding symptoms and difficulty feeling her bladder started after a complex hysterectomy for large fibroid uterus, complicated by small cystotomy which was repaired and healed on its own.  She is very unclear leaking symptoms, not specifically with urgency or with certain activities.  She has no known recent traumas or neurological history to account for her symptoms.  I suspect underlying pelvic floor dysfunction, hence referral to physical therapy.  But due to her cystotomy and complex hysterectomy, she is also at risk for a potential vesicovaginal fistula, and I recommend working up for both.  - PROCEDURE CYSTOSCOPY -rule out vesicovaginal fistula  - PROCEDURE URODYNAMICS -evaluate voiding dysfunction   - Referral to pelvic physical therapy to address neuromuscular functioning.      3. Frequent UTI  History of UTI, unclear urine culture results.  Standing urine culture placed for if she develops any of the symptoms in the future to fully evaluate her symptoms and prevent further infections.  - URINE CULTURE(NEW); Standing  - POCT Urinalysis                 Arpit Olguin MD, FACOG  Urogynecology and Reconstructive Pelvic Surgery  Department of Obstetrics and Gynecology  [facility]        This medical record contains text that has been entered with the assistance of computer voice recognition and dictation software.  Therefore, it may contain  unintended errors in text, spelling, punctuation, or grammar

## 2025-01-28 NOTE — PATIENT INSTRUCTIONS
Preferred [place] [facility] therapists:     [facility]:   Caity Morse DPT and Janette Servin DPT  [address] [address]  [phone]  [email or web address]  [facility]  Chula Vizcarra DPT  [address], [address]  [place], [place], [address]  [email or web address]  [facility]   Carissa Valente DPT   [address]    Phone: [phone]   [email or web address]

## 2025-02-07 ENCOUNTER — TELEPHONE (OUTPATIENT)
Dept: OBGYN | Facility: CLINIC | Age: 45
End: 2025-02-07
Payer: COMMERCIAL

## (undated) DEVICE — SYSTEM CLEARIFY VISUALIZATION (10EA/PK)

## (undated) DEVICE — SLEEVE, VASO, THIGH, MED

## (undated) DEVICE — COVER TIP ENDOWRIST HOT SHEAR - (10EA/BX) DA VINCI

## (undated) DEVICE — PACK GYN DAVINCI (2EA/CA)

## (undated) DEVICE — SUCTION INSTRUMENT YANKAUER OPEN TIP W/O VENT (50EA/CA)

## (undated) DEVICE — SEAL 5MM-8MM UNIVERSAL  BOX OF 10

## (undated) DEVICE — GLOVE BIOGEL PI ORTHO SZ 6 1/2 SURGICAL PF LF (40PR/BX)

## (undated) DEVICE — LACTATED RINGERS INJ 1000 ML - (14EA/CA 60CA/PF)

## (undated) DEVICE — TUBE CONNECT SUCTION CLEAR 120 X 1/4" (50EA/CA)"

## (undated) DEVICE — OBTURATOR BLADELESS STANDARD 8MM (6EA/BX)

## (undated) DEVICE — MASK ANESTHESIA ADULT  - (100/CA)

## (undated) DEVICE — SYRINGE NON SAFETY 10 CC 20 GA X 1-1/2 IN (100/BX 4BX/CA)

## (undated) DEVICE — PAD OR TABLE DA VINCI 2IN X 20IN X 72IN - (12EA/CA)

## (undated) DEVICE — GLOVE SZ 7.5 BIOGEL PI MICRO - PF LF (50PR/BX)

## (undated) DEVICE — SET EXTENSION WITH 2 PORTS (48EA/CA) ***PART #2C8610 IS A SUBSTITUTE*****

## (undated) DEVICE — GOWN WARMING STANDARD FLEX - (30/CA)

## (undated) DEVICE — ELECTRODE 850 FOAM ADHESIVE - HYDROGEL RADIOTRNSPRNT (50/PK)

## (undated) DEVICE — TUBING CLEARLINK DUO-VENT - C-FLO (48EA/CA)

## (undated) DEVICE — TOWEL STOP TIMEOUT SAFETY FLAG (40EA/CA)

## (undated) DEVICE — SET SUCTION/IRRIGATION WITH DISPOSABLE TIP (6/CA )PART #0250-070-520 IS A SUB

## (undated) DEVICE — GLOVE BIOGEL PI ORTHO SZ 6 SURGICAL PF LF (40PR/BX)

## (undated) DEVICE — PACK TRENGUARD 450 PROCEDURE (12EA/CA)

## (undated) DEVICE — GLOVE SZ 7 BIOGEL PI MICRO - PF LF (50PR/BX 4BX/CA)

## (undated) DEVICE — SET IRRIGATION CYSTOSCOPY TUBE L80 IN (20EA/CA)

## (undated) DEVICE — DRAPE COLUMN  BOX OF 20

## (undated) DEVICE — NEPTUNE 4 PORT MANIFOLD - (20/PK)

## (undated) DEVICE — GLOVE BIOGEL SZ 7 SURGICAL PF LTX - (50PR/BX 4BX/CA)

## (undated) DEVICE — SUTURE 0 VICRYL PLUS CT-2 - 27 INCH (36/BX)

## (undated) DEVICE — ROBOTIC SURGERY SERVICES

## (undated) DEVICE — BLADE SURGICAL #10 - (50/BX)

## (undated) DEVICE — GLOVE SZ 6.5 BIOGEL PI MICRO - PF LF (50PR/BX)

## (undated) DEVICE — GLOVE SZ 6 BIOGEL PI MICRO - PF LF (50PR/BX 4BX/CA)

## (undated) DEVICE — SET LEADWIRE 5 LEAD BEDSIDE DISPOSABLE ECG (1SET OF 5/EA)

## (undated) DEVICE — SUTURE GENERAL

## (undated) DEVICE — SUTURE 0 PDS CT-2 (36PK/BX)

## (undated) DEVICE — CANISTER SUCTION 3000ML MECHANICAL FILTER AUTO SHUTOFF MEDI-VAC NONSTERILE LF DISP  (40EA/CA)

## (undated) DEVICE — SUCTION INSTRUMENT YANKAUER BULBOUS TIP W/O VENT (50EA/CA)

## (undated) DEVICE — SPECIMEN BAG ENDOCATCH II15MM 15MM SPECIMEN RETRIEVAL (3EA/CA)

## (undated) DEVICE — KIT ANESTHESIA W/CIRCUIT & 3/LT BAG W/FILTER (20EA/CA)

## (undated) DEVICE — GLOVE BIOGEL INDICATOR SZ 7SURGICAL PF LTX - (50/BX 4BX/CA)

## (undated) DEVICE — PAD SANITARY 11IN MAXI IND WRAPPED  (12EA/PK 24PK/CA)

## (undated) DEVICE — SENSOR SPO2 NEO LNCS ADHESIVE (20/BX) SEE USER NOTES

## (undated) DEVICE — UTERINE MANIP V-CARE LARGE - (DAVINCI)  8/CA

## (undated) DEVICE — SUTURE 4-0 MONOCRYL PLUS PS-2 - 27 INCH (36/BX)

## (undated) DEVICE — PEN SKIN MARKER W/RULER - (50EA/BX)

## (undated) DEVICE — GLOVE BIOGEL PI INDICATOR SZ 7.0 SURGICAL PF LF - (50/BX 4BX/CA)

## (undated) DEVICE — GLOVE BIOGEL PI INDICATOR SZ 8.0 SURGICAL PF LF -(50/BX 4BX/CA)

## (undated) DEVICE — COVER FOOT UNIVERSAL DISP. - (25EA/CA)

## (undated) DEVICE — WATER IRRIG. STER 3000 ML - (4/CA)

## (undated) DEVICE — JELLY SURGILUBE STERILE TUBE 4.25 OZ (1/EA)

## (undated) DEVICE — SUTURE 3-0 VICRYL PLUS SH - 27 INCH (36/BX)

## (undated) DEVICE — ARMREST CRADLE FOAM - (2PR/PK 12PR/CA)

## (undated) DEVICE — TUBE E-T HI-LO CUFF 7.5MM (10EA/PK)

## (undated) DEVICE — NEEDLE INSFL 120MM 14GA VRRS - (20/BX)

## (undated) DEVICE — WATER IRRIGATION STERILE 1000ML (12EA/CA)

## (undated) DEVICE — GLOVE BIOGEL PI INDICATOR SZ 6.5 SURGICAL PF LF - (50/BX 4BX/CA)

## (undated) DEVICE — ELECTRODE DUAL RETURN W/ CORD - (50/PK)

## (undated) DEVICE — BRIEF STRETCH MATERNITY M/L - FITS 20-60IN (5EA/BG 20BG/CA)

## (undated) DEVICE — SODIUM CHL IRRIGATION 0.9% 1000ML (12EA/CA)

## (undated) DEVICE — SET TUBING PNEUMOCLEAR HIGH FLOW SMOKE EVACUATION (10EA/BX)

## (undated) DEVICE — BAG DRAINAGE LEG TWIST-VALVE STRAP LARGE 32OZ (48EA/CA)

## (undated) DEVICE — PAD LAP STERILE 18 X 18 - (5/PK 40PK/CA)

## (undated) DEVICE — DRAPE ARM  BOX OF 20